# Patient Record
Sex: FEMALE | Race: WHITE
[De-identification: names, ages, dates, MRNs, and addresses within clinical notes are randomized per-mention and may not be internally consistent; named-entity substitution may affect disease eponyms.]

---

## 2018-09-04 ENCOUNTER — HOSPITAL ENCOUNTER (EMERGENCY)
Dept: HOSPITAL 97 - ER | Age: 63
Discharge: ANOTHER HOSPITAL | End: 2018-09-04
Payer: COMMERCIAL

## 2018-09-04 DIAGNOSIS — S06.5X9A: Primary | ICD-10-CM
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
## 2018-09-04 DIAGNOSIS — E03.9: ICD-10-CM
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
## 2018-09-04 LAB
BUN BLD-MCNC: 14 MG/DL (ref 7–18)
CKMB CREATINE KINASE MB: < 1 NG/ML (ref 0.3–3.6)
GLUCOSE SERPLBLD-MCNC: 166 MG/DL (ref 74–106)
HCT VFR BLD CALC: 37.5 % (ref 36–45)
INR BLD: 1.12
LYMPHOCYTES # SPEC AUTO: 0.3 K/UL (ref 0.7–4.9)
MCH RBC QN AUTO: 30.7 PG (ref 27–35)
MCV RBC: 89.9 FL (ref 80–100)
PMV BLD: 9.6 FL (ref 7.6–11.3)
POTASSIUM SERPL-SCNC: 3.5 MMOL/L (ref 3.5–5.1)
RBC # BLD: 4.17 M/UL (ref 3.86–4.86)
TROPONIN (EMERG DEPT USE ONLY): < 0.02 NG/ML (ref 0–0.04)

## 2018-09-04 PROCEDURE — 80048 BASIC METABOLIC PNL TOTAL CA: CPT

## 2018-09-04 PROCEDURE — 84484 ASSAY OF TROPONIN QUANT: CPT

## 2018-09-04 PROCEDURE — 82553 CREATINE MB FRACTION: CPT

## 2018-09-04 PROCEDURE — 85730 THROMBOPLASTIN TIME PARTIAL: CPT

## 2018-09-04 PROCEDURE — 72125 CT NECK SPINE W/O DYE: CPT

## 2018-09-04 PROCEDURE — 99285 EMERGENCY DEPT VISIT HI MDM: CPT

## 2018-09-04 PROCEDURE — 93005 ELECTROCARDIOGRAM TRACING: CPT

## 2018-09-04 PROCEDURE — 76377 3D RENDER W/INTRP POSTPROCES: CPT

## 2018-09-04 PROCEDURE — 85610 PROTHROMBIN TIME: CPT

## 2018-09-04 PROCEDURE — 85025 COMPLETE CBC W/AUTO DIFF WBC: CPT

## 2018-09-04 PROCEDURE — 82550 ASSAY OF CK (CPK): CPT

## 2018-09-04 PROCEDURE — 36415 COLL VENOUS BLD VENIPUNCTURE: CPT

## 2018-09-04 PROCEDURE — 70450 CT HEAD/BRAIN W/O DYE: CPT

## 2018-09-04 PROCEDURE — 70486 CT MAXILLOFACIAL W/O DYE: CPT

## 2018-09-04 NOTE — RAD REPORT
EXAM DESCRIPTION:  CT - [id number] - 9/4/2018 7:37 pm

 

CLINICAL HISTORY:  Fall, head injury, lethargy

 

COMPARISON:  CT head August 20, 2018.

 

TECHNIQUE:  Axial 5 mm thick images of the head were obtained.  Axial 2 mm thick images of the cervic
al spine were obtained with sagittal and coronal reconstruction images generated and reviewed.

 

All CT scans are performed using dose optimization technique as appropriate and may include automated
 exposure control or mA/KV adjustment according to patient size.

 

FINDINGS:  An acute subdural hematoma is present 7 mm in thickness along the lateral margin of the ri
ght frontal and parietal lobes. Anteriorly there is a small interhemispheric subdural hematoma. No mi
dline shift is present. Patient has a significant posttraumatic injury to the bilateral frontal lobes
 and subfrontal parenchyma. There is intraparenchymal hemorrhage present worse on the right as well a
s cortical contusion change. Significant frontal lobe edema is present.

 

No intraventricular hemorrhage identified. No acute cortical based infarction. Mastoid air cells and 
paranasal sinuses are clear. No globe or orbit abnormality seen.

 

The patient's left-sided occipital and suboccipital fracture of the skull has not changed. Superior r
ight occipital fracture also present and unchanged.

 

The patient had significant left frontal injury on the August 28 examination. The right-sided subdura
l hematoma is new and the extensive right-sided intraparenchymal and cortical contusion bulla changes
 are new from August 20.

 

Cervical body height and alignment are normal. Disc space narrowing is present at C4-5 and C5-6. Mild
 facet degenerative change present. No fracture or acute bony abnormality. Central canal detail is in
herently limited.

 

No paraspinal mass or hematoma.

 

IMPRESSION:  Right lateral frontal parietal acute subdural hematoma 7 mm in thickness. There is a sma
ll anterior interhemispheric subdural hematoma. No resulting midline shift.

 

Right frontal intraparenchymal hemorrhage and subcortical contusion changes. This extends into the an
terior right temporal lobe. Significant white matter edema is present. This is new from August 20.

 

Left frontal white matter edema with trace amount of cortical contusion in the subfrontal region has 
not changed from August 20.

 

Cervical spine degenerative change with no fracture.

## 2018-09-04 NOTE — XMS REPORT
Continuity of Care Document

 Created on:2018



Patient:SUNI BILL

Sex:Female

:1955

External Reference #:[id number]





Demographics







 Address  [address] [place] [id number]

 

 Phone  [phone]

 

 Preferred Language  Unknown

 

 Marital Status  Unknown

 

 Protestant Affiliation  Unknown

 

 Race  Unknown

 

 Ethnic Group  Unknown









Author







 Organization  [facility]









Problems







 Problem  Status  Onset  Classification  Date  Comments  Source



     Date    Reported    



             



             

 

 SDH  Active          [address]



             



             

 

 FALL  Active          [address]



             



             

 

 JUAN BILLING  Active          [address]



             



             

 

 R SDH, PONTINE  Active           [facility]



 [facility]    8        



             



             

 

 ICH  Active           [facility]



     8        



             



             

 

 ICH (<span  Active    Problem  2018    [facility]



 ID="[id number]"            Medical



 >Confirmed</span>            [place]



 )            



             

 

 Dysphagia  Active    Problem  2018    [facility]



             



             

 

 Skull fracture  Active    Problem  2018    [facility]



             



             

 

 HTN (<span  Active    Problem  2018    [facility]



 ID="[id number]"            Medical



 >Confirmed</span>            [place]



 )            



             

 

 TRAUM SUBRAC HEM  Active          [facility]            Medical



 DURATION,            Center



             



             

 

 NONTRAUMATIC  Active           [facility]



 INTRACEREBRAL            



 HEMORRHAGE, U            



             



             







Medications







 Medication  Details  Route  Status  Patient  Ordering  Order  Source



         Instructions  Provider  Date  



               



               



               

 

 levothyroxine 88  88    On Hold      07/10/2  [facility]



 mcg (0.088 mg)  microgram=1          018  Medical



 oral tablet  tab, PO,            Center



   Q630AM, 0            



   Refill(s)            



               



               

 

 bisacodyl 10 mg  10 mg=1 supp,    On Hold      07/10/2  [facility]



 rectal  TN, Daily,          018  Medical



 suppository  PRN            [place]



   Constipation,            



   0 Refill(s)            



               



               

 

 acetaminophen  PO, Q6H, PRN    On Hold      07/10/2  [facility]



 160 mg/5 mL oral  Pain 1-3/Temp          018  Medical



 suspension  > 100.4 F, 0            Center



   Refill(s)            



               



               

 

 sennosides, USP  8.6 mg=1 tab,    On Hold      07/10/2  [facility]



 8.6 MG Oral  PO, Q12H, 0          018  Medical



 Tablet  Refill(s)            [place]



               



               

 

 rOPINIRole 0.5  1 mg=2 tab,    On Hold      07/10/2  [facility]



 mg oral tablet  PO, Bedtime,          018  Medical



   0 Refill(s)            [place]



               



               

 

 polyethylene  PO, Daily, 0    On Hold      07/10/2  [facility]



 glycol 3350 oral  Refill(s)          018  Medical



 powder for              [place]



 reconstitution              



               



               

 

 Melatonin 3 MG  3 mg=1 tab,    On Hold      07/10/2  [facility]



 Extended Release  PO, Bedtime,          018  Medical



 Tablet  0 Refill(s)            [place]



               



               

 

 heparin sodium,  SUB-Q, Q8H, 0    On Hold      07/10/2  [facility]



 porcine 2500  Refill(s)          018  Medical



 UNT/ML              Center



 Injectable              



 Solution              



               

 

 Docusate Sodium  PO, Q12H, 0    On Hold      07/10/2  [facility]



 100 MG Oral  Refill(s)          [facility]  Medical



 Capsule              [place]



               



               

 

 carvedilol 25 mg  25 mg=1 tab,    On Hold      07/10/2  [facility]



 oral tablet  PO, Q12H, 0          018  Medical



   Refill(s)            [place]



               



               

 

 Aspirin 81 MG  81 mg=1 tab,    On Hold      07/10/2  [facility]



 Enteric Coated  PO, Daily, 0          018  Medical



 Tablet  Refill(s)            [place]



               



               

 

 amLODIPine 10 mg  10 mg=1 tab,    On Hold      07/10/2  [facility]



 oral tablet  PO, Daily, 0          018  Medical



   Refill(s)            [place]



               



               

 

 Bisacodyl  10 mg, 1    Inactive        [facility]



   supp, Route:          018  Medical



   TN, Drug            Center



   form: SUPP,            



   ONCE, Dosing            



   Weight            



   72.727, kg,            



   PRN as needed            



   for            



   constipation,            



   Start date:            



   18            



   8:22:00            



   CDTNotes:            



   (Same As:            



   Dulcolax,            



   Bisco-Lax)            



               



               

 

 Miralax  17 gm, 1 pkt,    No Longer        [facility]



   Route: PO,    Active      018  Medical



   Drug form:            [place]



   PWDR, Daily,            



   Dosing Weight            



   72.727, kg,            



   Start date:            



   18            



   9:00:00 CDT,            



   Duration: 30            



   day, Stop            



   date:            



   18            



   9:00:00            



   CDTNotes:            



   Dissolve in 8            



   oz of water            



   or juice.            



   (Same as:            



   Miralax)            



               



               

 

 Benadryl  25 mg, 1 cap,    No Longer        [facility]



   Route: PO,    Active      018  Medical



   Drug form:            [place]



   CAP, Bedtime,            



   Dosing Weight            



   72.727, kg,            



   PRN as needed            



   for insomnia,            



   Start date:            



   18            



   21:00:00 CDT,            



   Stop date:            



   18            



   21:00:00            



   CDTNotes:            



   (Same as:            



   Benadryl)            



               



               

 

 Benadryl  25 mg, 0.5    Inactive        [facility]



   mL, Route:          018  Medical



   IVP, Drug            Center



   form: INJ,            



   ONCE, Dosing            



   Weight            



   72.727, kg,            



   PRN Insomnia,            



   Start date:            



   18            



   21:28:00            



   CDTNotes:            



   (Same as:            



   Benadryl)            



               



               

 

 Benadryl  25 mg, Route:    No Longer        [facility]



   PO, Bedtime,    Active      018  Medical



   Dosing Weight            Center



   72.727, kg,            



   Start date:            



   18            



   21:00:00 CDT,            



   Duration: 30            



   day, Stop            



   date:            



   18            



   21:00:00 CDT            



               



               

 

 Benadryl  25 mg, 1 cap,    Inactive        [facility]



   Route: PO,          018  Medical



   Drug form:            [place]



   CAP, ONCE,            



   Dosing Weight            



   72.727, kg,            



   PRN as needed            



   for insomnia,            



   Start date:            



   18            



   23:30:00            



   CDTNotes:            



   (Same as:            



   Benadryl)            



               



               

 

 Aspirin Enteric  81 mg, 1 tab,    No Longer        [facility]



 Coated  Route: PO,    Active      018  Medical



   Drug form:            [place]



   ECTAB, Daily,            



   Dosing Weight            



   72.727, kg,            



   Start date:            



   18            



   9:00:00 CDT,            



   Duration: 30            



   day, Stop            



   date:            



   18            



   9:00:00            



   CDTNotes: Do            



   not crush or            



   chew. (Same            



   As: Ecotrin)            



               



               

 

 mannitol 20%  35 gm, 175    No Longer        [facility]



 intravenous  mL, Route:    Active      018  Medical



 solution  IV, Drug            Center



   form: INJ,            



   Q12H, Dosing            



   Weight            



   72.727, kg,            



   Start date:            



   18            



   17:00:00 CDT,            



   Duration: 30            



   day, Stop            



   date:            



   18            



   7:00:00            



   CDTNotes:            



   (Same as:            



   Osmitrol)            



   Infuse            



   through 5            



   micron or            



   smaller            



   filter            



   WASTE: F/P -            



   Sink; E -            



   Municipal            



   Trash Bin            



               



               

 

 Metoprolol  5 mg, 5 mL,    No Longer        [facility]



   Route: IVP,    Active      018  Medical



   Drug form:            [place]



   INJ, Q30Min,            



   Dosing Weight            



   72.727, kg,            



   PRN            



   Tachycardia,            



   Start date:            



   18            



   13:09:00 CDT,            



   Duration: 30            



   day, Stop            



   date:            



   18            



   13:08:00            



   CDTNotes:            



   (Same as:            



   Lopressor)            



   Push over 2            



   minutes            



               



               

 

 mannitol 20%  35 gm, 175    No Longer        [facility]



 intravenous  mL, Route:    Active      018  Medical



 solution  IV, Drug            Center



   form: INJ,            



   Q8H-05,            



   Dosing Weight            



   72.727, kg,            



   Start date:            



   18            



   20:00:00 CDT,            



   Duration: 30            



   day, Stop            



   date:            



   18            



   12:00:00            



   CDTNotes:            



   (Same as:            



   Osmitrol)            



   Infuse            



   through 5            



   micron or            



   smaller            



   filter            



   WASTE: F/P -            



   Sink; E -            



   Municipal            



   Trash Bin            



               



               

 

 mannitol 20%  35 gm, 175    Inactive        [facility]



 intravenous  mL, Route:          018  Medical



 solution  IV, Drug            Center



   form: INJ,            



   ABXQ8H,            



   Dosing Weight            



   72.727, kg,            



   Start date:            



   18            



   14:00:00 CDT,            



   Duration: 30            



   day, Stop            



   date:            



   18            



   6:00:00            



   CDTNotes:            



   (Same as:            



   Osmitrol)            



   Infuse            



   through 5            



   micron or            



   smaller            



   filter            



   WASTE: F/P -            



   Sink; E -            



   Municipal            



   Trash Bin            



               



               

 

 mannitol 20%  35 gm, 175    Inactive        [facility]



 intravenous  mL, Route:          018  Medical



 solution  IV, Drug            Center



   form: INJ,            



   Q6H, Dosing            



   Weight            



   72.727, kg,            



   Start date:            



   18            



   12:00:00 CDT,            



   Duration: 30            



   day, Stop            



   date:            



   18            



   6:00:00            



   CDTNotes:            



   (Same as:            



   Osmitrol)            



   Infuse            



   through 5            



   micron or            



   smaller            



   filter            



   WASTE: F/P -            



   Sink; E -            



   Municipal            



   Trash Bin            



               



               

 

 Vasotec  0.625 mg, 0.5    No Longer        [facility]



   mL, Route:    Active      018  Medical



   IVP, Drug            Center



   form: INJ,            



   Q6H, Dosing            



   Weight            



   72.727, kg,            



   PRN            



   Hypertension,            



   Start date:            



   18            



   23:37:00 CDT,            



   Duration: 30            



   day, Stop            



   date:            



   18            



   23:36:00            



   CDTNotes:            



   (Same as:            



   Vasotec-IV)            



               



               

 

 Norvasc  10 mg, 1 tab,    No Longer        [facility]



   Route: PO,    Active      018  Medical



   Drug form:            [place]



   TAB, Daily,            



   Dosing Weight            



   72.727, kg,            



   Priority:            



   STAT, Start            



   date:            



   18            



   23:26:00 CDT,            



   Duration: 30            



   day, Stop            



   date:            



   18            



   9:00:00            



   CDTNotes:            



   (Same as:            



   Norvasc)            



               



               

 

 Coreg  25 mg, 1 tab,    No Longer        [facility]



   Route: PO,    Active      018  Medical



   Drug form:            [place]



   TAB, Q12H,            



   Dosing Weight            



   72.727, kg,            



   Start date:            



   18            



   21:00:00 CDT,            



   Stop date:            



   18            



   9:00:00            



   CDTNotes:            



   Give with            



   food. (Same            



   As: Coreg)            



               



               

 

 Hydralazine  25 mg, 1 tab,    No Longer        [facility]



 Hydrochloride 25  Route: PO,    Active      018  Medical



 MG Oral Tablet  Drug form:            [place]



   TAB, Q6H,            



   Dosing Weight            



   72.727, kg,            



   PRN            



   Hypertension,            



   Start date:            



   18            



   10:11:00 CDT,            



   Duration: 30            



   day, Stop            



   date:            



   18            



   10:10:00            



   CDTNotes:            



   (Same as:            



   Apresoline)            



   May interfere            



   w/enteral            



   feedings            



               



               



          Take            



   With Food.            



               



               

 

 Labetalol  20 mg, 4 mL,    No Longer        [facility]



   Route: IVP,    Active      018  Medical



   Drug form:            [place]



   INJ, Q1H,            



   Dosing Weight            



   72.727, kg,            



   PRN            



   Hypertension,            



   Start date:            



   18            



   21:38:00 CDT,            



   Stop date:            



   18            



   21:37:00 CDT            



               



               

 

 Mannitol  35 gm, 175    No Longer        [facility]



   mL, Route:    Active      018  Medical



   IVPB, Drug            Center



   form: INJ,            



   Q6H, Dosing            



   Weight            



   72.727, kg,            



   Start date:            



   18            



   12:00:00 CDT,            



   Duration: 30            



   day, Stop            



   date:            



   18            



   6:00:00            



   CDTNotes:            



   (Same as:            



   Osmitrol)            



   Infuse            



   through 5            



   micron or            



   smaller            



   filter            



   WASTE: F/P -            



   Sink; E -            



   Municipal            



   Trash Bin            



               



               

 

 Fleet Enema  133 mL,    Inactive        [facility]



   Route: TN,          018  Medical



   Drug Form:            [place]



   WAN, Dosing            



   Weight            



   72.727, kg,            



   ONCE, PRN as            



   needed for            



   constipation,            



   Start date:            



   18            



   10:10:00 CDT            



               



               

 

 Miralax  17 gm, 1 pkt,    No Longer        [facility]



   Route: PO,    Active      018  Medical



   Drug form:            [place]



   PWDR, BID,            



   Dosing Weight            



   72.727, kg,            



   Start date:            



   18            



   9:00:00 CDT,            



   Duration: 30            



   day, Stop            



   date:            



   18            



   17:00:00            



   CDTNotes:            



   Dissolve in 8            



   oz of water            



   or juice.            



   (Same as:            



   Miralax)            



               



               

 

 magnesium  150 ml,    Inactive        [facility]



 citrate 58.2  Route: PO,          018  Medical



 MG/ML Oral  Drug Form:            [place]



 Solution  LIQ, Dosing            



   Weight            



   72.727, kg,            



   ONCE, PRN            



   Constipation,            



   Start date:            



   18            



   7:52:00            



   CDTNotes:            



   (Same as:            



   Citrate of            



   [place])            



   Concentration            



   : 1.745 gm /            



   30 mL            



               



               

 

 ropinirole  1 mg, 2 tab,    No Longer        [facility]



   Route: PO,    Active      018  Medical



   Drug form:            [place]



   TAB, Bedtime,            



   Dosing Weight            



   72.727, kg,            



   Start date:            



   18            



   21:00:00 CDT,            



   Duration: 30            



   day, Stop            



   date:            



   18            



   21:00:00            



   CDTNotes:            



   (Same as:            



   Requip)            



               



               

 

 Clonidine  0.1 mg, 1    Inactive        [facility]



 Hydrochloride  tab, Route:          018  Medical



 0.1 MG Oral  PO, Drug            [place]



 Tablet  form: TAB,            



   Bedtime,            



   Dosing Weight            



   72.727, kg,            



   Start date:            



   18            



   21:00:00 CDT,            



   Duration: 7            



   day, Stop            



   date:            



   18            



   21:00:00            



   CDTNotes:            



   (Same As:            



   Catapres)            



               



               

 

 Melatonin 3 MG  3 mg, 1 tab,    No Longer        [facility]



 Extended Release  Route: PO,    Active      018  Medical



 Tablet  Drug Form:            [place]



   TAB, Dosing            



   Weight            



   72.727, kg,            



   Bedtime,            



   Start date:            



   18            



   21:00:00 CDT,            



   Duration: 30            



   day, Stop            



   date:            



   18            



   21:00:00            



   CDTNotes:            



   (Same as:            



   Melatonin)            



               



               

 

 normal saline  1,000 mL,    No Longer        [facility]



 0.9% IV 1,000 mL  Rate: 50    Active      018  Medical



   ml/hr, Infuse            Center



   over: 20 hr,            



   Route: IV,            



   Dosing Weight            



   72.727 kg,            



   Total Volume:            



   1,000, Start            



   date:            



   18            



   9:44:00 CDT,            



   Duration: 30            



   day, Stop            



   date:            



   18            



   9:43:00 CDT,            



   1.84, m2            



               



               

 

 Diltiazem  10 mg, 2 mL,    Inactive        [facility]



   Route: IV,          018  Medical



   Drug form:            Center



   INJ, ONCE,            



   Dosing Weight            



   72.727, kg,            



   Start date:            



   18            



   7:51:00 CDT,            



   Stop date:            



   18            



   7:51:00            



   CDTNotes:            



   (Same as:            



   Cardizem)            



               



               

 

 ropinirole  0.25 mg, 1    Inactive        [facility]



   tab, Route:          018  Medical



   NG, Drug            Center



   form: TAB,            



   ONCE, Dosing            



   Weight            



   72.727, kg,            



   Start date:            



   18            



   7:41:00 CDT,            



   Stop date:            



   18            



   7:41:00            



   CDTNotes:            



   (Same as:            



   Requip)            



               



               

 

 NS (Bolus) IV  250 mL, 250    Inactive        [facility]



   ml/hr, Infuse          [address] [place] [address]



   Over: 1 hr,            [place]



   Route: IV,            



   250, Drug            



   form: INJ,            



   ONCE,            



   Priority:            



   STAT, Dosing            



   Weight 72.727            



   kg, Start            



   date:            



   18            



   7:19:00 CDT,            



   Stop date:            



   18            



   7:19:00 CDT            



               



               

 

 Metoprolol  5 mg, 5 mL,    No Longer        [facility]



   Route: IVP,    Active      018  Medical



   Drug form:            Center



   INJ, Q5Min,            



   Dosing Weight            



   72.727, kg,            



   PRN Other            



   -See Comment,            



   Start date:            



   18            



   7:16:00 CDT,            



   Duration: 2            



   doses or            



   times, Stop            



   date: Limited            



   # of            



   timesNotes:            



   (Same as:            



   Lopressor)            



   Push over 2            



   minutes            



               



               

 

 Cardene 20 mg in  20 mg, 200    Inactive        [facility]



  mL  mL, Rate:          018  Medical



 (Titrate.) IV 20  Titrate,            Center



 mg  Start Dose: 5            



   mg/hr,            



   Titration:            



   2.5 mg/hr            



   every 15            



   minutes,            



   Goal(s): goal            



   SBP Notes:            



   Same as:            



   Cardene            



   Concentration            



   : (0.1 mg/ 1            



   ml)            



               

 

 Labetalol  10 mg, 2 mL,    No Longer        [facility]



   Route: IVP,    Active      018  Medical



   Drug form:            Center



   INJ, ONCE,            



   Dosing Weight            



   72.727, kg,            



   PRN            



   Hypertension,            



   Start date:            



   18            



   0:24:00 CDT            



               



               

 

 Coreg  6.25 mg, 1    No Longer        [facility]



   tab, Route:    Active      018  Medical



   PO, Drug            Center



   form: TAB,            



   Q12H, Dosing            



   Weight            



   72.727, kg,            



   Start date:            



   18            



   0:00:00 CDT,            



   Duration: 30            



   day, Stop            



   date:            



   18            



   21:00:00            



   CDTNotes:            



   Give with            



   food. (Same            



   As: Coreg)            



               



               

 

 Hydralazine  10 mg, 0.5    No Longer        [facility]



   mL, Route:    Active      018  Medical



   IVP, Drug            Center



   form: INJ,            



   Q2H, Dosing            



   Weight            



   72.727, kg,            



   PRN            



   Hypertension,            



   Start date:            



   18            



   22:28:00 CDT,            



   Duration: 30            



   day, Stop            



   date:            



   18            



   22:27:00            



   CDTNotes:            



   (Same as:            



   Apresoline)            



   Push over 5            



   minutes            



               



               

 

 Mannitol  25 gm, 125    No Longer        [facility]



   mL, Route:    Active      018  Medical



   IVPB, Drug            Center



   form: INJ,            



   Q8H-01,            



   Dosing Weight            



   72.727, kg,            



   Priority:            



   NOW, Start            



   date:            



   18            



   17:09:00 CDT,            



   Duration: 30            



   day, Stop            



   date:            



   18            



   17:00:00            



   CDTNotes:            



   (Same as:            



   Osmitrol)            



   Infuse            



   through 5            



   micron or            



   smaller            



   filter            



   WASTE: F/P -            



   Sink; E -            



   Municipal            



   Trash Bin            



               



               

 

 Mannitol  25 gm, 125    Inactive        [facility]



   mL, Route:          018  Medical



   IVPB, Drug            Center



   form: INJ,            



   Q6H, Dosing            



   Weight            



   72.727, kg,            



   PRN Other            



   -See Comment,            



   Start date:            



   18            



   14:50:00 CDT,            



   Duration: 30            



   day, Stop            



   date:            



   18            



   14:49:00            



   CDTNotes:            



   (Same as:            



   Osmitrol)            



   Infuse            



   through 5            



   micron or            



   smaller            



   filter            



   WASTE: F/P -            



   Sink; E -            



   Municipal            



   Trash Bin            



               



               

 

 Mannitol  50 gm, 250    Inactive         [place]



   mL, Route:          018  Medical



   IVPB, Drug            Center



   form: INJ,            



   ONCE, Dosing            



   Weight            



   72.727, kg,            



   Start date:            



   18            



   10:21:00 CDT,            



   Stop date:            



   18            



   10:21:00            



   CDTNotes:            



   (Same as:            



   Osmitrol)            



   Infuse            



   through 5            



   micron or            



   smaller            



   filter            



   WASTE: F/P -            



   Sink; E -            



   Municipal            



   Trash Bin            



               



               

 

 Levetiracetam  500 mg, 1    No Longer         [place]



 500 MG Oral  tab, Route:    Active      018  Medical



 Tablet [Keppra]  PO, Drug            Center



   form: TAB,            



   Q12H, Dosing            



   Weight            



   72.727, kg,            



   Start date:            



   18            



   9:00:00 CDT,            



   Stop date:            



   18            



   21:00:00            



   CDTNotes:            



   (Same            



   as:Keppra)            



               



               

 

 Ciprofloxacin 3  5 drp, Route:    No Longer        [facility]



 MG/ML /  LEFT EAR,    Active      018  Medical



 Dexamethasone 1  Drug Form:            Center



 MG/ML Otic  SOLN, Dosing            



 Suspension  Weight            



 [Ciprodex]  72.727, kg,            



   BID, Start            



   date:            



   18            



   9:00:00 CDT,            



   Duration: 7            



   day, Stop            



   date:            



   18            



   17:00:00            



   CDTNotes:            



   (Same As:            



   Ciprodex)            



               



               

 

 heparin sodium,  5,000 unit, 1    No Longer        [facility]



 porcine 2500  mL, Route:    Active      018  Medical



 UNT/ML  SUB-Q, Drug            Center



 Injectable  form: INJ,            



 Solution  Q8H, Dosing            



   Weight            



   72.727, kg,            



   Start date:            



   18            



   8:00:00 CDT,            



   Duration: 30            



   day, Stop            



   date:            



   18            



   0:00:00            



   CDTNotes:            



   porcine            



   heparin            



               



               

 

 Thyroxine  88 microgram,    No Longer        [facility]



   1 tab, Route:    Active      018  Medical



   PO, Drug            Center



   form: TAB,            



   Q630AM,            



   Dosing Weight            



   72.727, kg,            



   Start date:            



   18            



   6:30:00 CDT,            



   Duration: 30            



   day, Stop            



   date:            



   18            



   6:30:00            



   CDTNotes:            



   Take 1 hour            



   before or 2            



   hours after            



   meal; Enteral            



   feeds may            



   interefere            



   with the            



   absorption of            



   this            



   medication.            



   (Same            



   as:Synthroid)            



               



               

 

 sterile water  457.25 mL,    Inactive        [facility] [place]



 INJ 1000 ml  Rate: 999          018  Medical



 457.25 mL +  ml/hr, Infuse            Center



 sodium chloride  over: 0.5 hr,            



 23.4%  mEq  Route: IV,            



   Dosing Weight            



   72.727 kg,            



   Total Volume:            



   500, Start            



   date:            



   18            



   6:02:00 CDT,            



   Duration: 1            



   doses or            



   times, Stop            



   date:            



   18            



   6:31:00 CDT,            



   For IMU and            



   ICU use only.            



    See Order            



   Comments!!,            



   1.8...            



               



               

 

 Keppra  1,500 mg,    Inactive         [place]



   Route: IVPB,          018  Medical



   ONCE, Dosing            Center



   Weight            



   72.727, kg,            



   Loading Dose,            



   Start date:            



   18            



   4:16:00 CDT,            



   Stop date:            



   18            



   4:16:00            



   CDTNotes:            



   Same as            



   Keppra  Mix            



   with 100 mL            



   NS, LR or D5W            



     ***            



   MEDICATION            



   WASTE ***            



   Product Size:            



    500 mg            



   Product            



   Wasted:  _0__            



   mg            



               

 

 Water 1000 MG/ML  457.25 mL,    Inactive        [facility] [place]



 Injectable  Rate: 999          018  Medical



 Solution  ml/hr, Infuse            Center



   over: 0.5 hr,            



   Route: IV,            



   Dosing Weight            



   72.727 kg,            



   Total Volume:            



   500, Start            



   date:            



   18            



   3:50:00 CDT,            



   Duration: 1            



   doses or            



   times, Stop            



   date:            



   18            



   4:19:00 CDT,            



   For IMU and            



   ICU use only.            



    See Order            



   Comments!!,            



   1.8...            



               



               

 

 Levetiracetam  500 mg, 1    No Longer         [place]



 500 MG Oral  tab, Route:    Active      018  Medical



 Tablet [Keppra]  PO, Drug            Center



   form: TAB,            



   Q12H, Dosing            



   Weight            



   72.727, kg,            



   Start date:            



   18            



   21:00:00 CDT,            



   Duration: 30            



   day, Stop            



   date:            



   18            



   9:00:00            



   CDTNotes:            



   (Same            



   as:Keppra)            



               



               

 

 gabapentin 50  300 mg, 6 mL,    No Longer        [facility]



 MG/ML Oral  Route: PO,    Active      018  Medical



 Solution  Drug form:            Center



   SOLN, Q8H,            



   Dosing Weight            



   72.727, kg,            



   Start date:            



   18            



   18:00:00 CDT,            



   Duration: 30            



   day, Stop            



   date:            



   18            



   16:00:00            



   CDTNotes:            



   (Same as:            



   Neurontin)            



               



               

 

 gabapentin  300 mg, 1    Inactive        [facility]



   cap, Route:          018  Medical



   PO, Drug            Center



   form: CAP,            



   Q8H, Dosing            



   Weight            



   72.727, kg,            



   (CrCl > 60            



   ml/min),            



   Start date:            



   18            



   16:00:00 CDT,            



   Duration: 30            



   day, Stop            



   date:            



   18            



   8:00:00            



   CDTNotes:            



   (Same as:            



   Neurontin)            



               



               

 

 levothyroxine 88  88    No Longer        [facility] [place]



 mcg (0.088 mg)  microgram=1    Active      018  Medical



 oral tablet  tab, PO,            Center



   Daily, 0            



   Refill(s)            



               



               

 

 vortioxetine 10  10 mg=1 tab,    No Longer        [facility]



 MG Oral Tablet  PO, Daily, 0    Active      018  Medical



 [Trintellix]  Refill(s)            [place]



               



               

 

 donepezil 10 mg  10 mg=1 tab,    No Longer        [facility]



 oral tablet  PO, BID, 0    Active      018  Medical



   Refill(s)            [place]



               



               

 

 Saline Flush  10 ml, Route:    No Longer        [facility] [place]



 0.9%  IVP, Drug    Active      018  Medical



   Form: INJ,            Center



   Dosing Weight            



   72.727, kg,            



   Q12H, Start            



   date:            



   18            



   9:00:00 CDT,            



   Duration: 30            



   day, Stop            



   date:            



   18            



   21:00:00            



   CDTNotes:            



   (Same as: BD            



   Posiflush)            



               



               

 

 Levetiracetam  500 mg,    Inactive        [facility]



   Route: IVPB,          018  Medical



   Q12H, Dosing            Center



   Weight            



   72.727, kg,            



   Priority:            



   Routine,            



   Start date:            



   18            



   9:00:00 CDT,            



   Duration: 30            



   day, Stop            



   date:            



   18            



   21:00:00            



   CDTNotes:            



   Same as            



   Keppra  Mix            



   with 100 mL            



   NS, LR or D5W            



     ***            



   MEDICATION            



   WASTE ***            



   Product Size:            



    500 mg            



   Product            



   Wasted:  ___            



   mg            



               

 

 sennosides, USP  8.6 mg, 1    No Longer        [facility]



   tab, Route:    Active      018  Medical



   PO, Drug            Center



   Form: TAB,            



   Dosing Weight            



   72.727, kg,            



   Q12H, Start            



   date:            



   18            



   9:00:00 CDT,            



   Duration: 30            



   day, Stop            



   date:            



   18            



   21:00:00            



   CDTNotes:            



   (Same as:            



   Senokot)            



               



               

 

 Famotidine  20 mg, 2 mL,    Inactive        [facility]



   Route: IVP,          018  Medical



   Drug form:            Center



   INJ, Q12H,            



   Dosing Weight            



   72.727, kg,            



   Start date:            



   18            



   9:00:00 CDT,            



   Duration: 30            



   day, Stop            



   date:            



   18            



   21:00:00            



   CDTNotes:            



   (Same as:            



   Pepcid) Can            



   be dilute in            



   5-10cc NS            



   IVP: Slow IV            



   push over at            



   least 2            



   minutes.            



               



               

 

 Docusate Sodium  100 mg, 10    No Longer        [facility]



 100 MG Oral  mL, Route:    Active      018  Medical



 Capsule  PO, Drug            Center



   form: LIQ,            



   Q12H, Dosing            



   Weight            



   72.727, kg,            



   Start date:            



   18            



   9:00:00 CDT,            



   Stop date:            



   18            



   21:00:00            



   CDTNotes:            



   (Same as:            



   William)            



               



               

 

 Versed  2 mg, 2 mL,    No Longer        [facility]



   Route: IVP,    Active      018  Medical



   Drug form:            Center



   INJ, ONCE,            



   Dosing Weight            



   72.727, kg,            



   Start date:            



   18            



   7:59:00 CDT,            



   Stop date:            



   18            



   7:59:00            



   CDTNotes:            



   (Same as:            



   Versed)   ***            



   MEDICATION            



   WASTE ***            



   Product Size:            



    2 mg Product            



   Wasted:  ___            



   mg            



               

 

 Versed  2 mg, 2 mL,    No Longer        [facility]



   Route: IVP,    Active      018  Medical



   Drug form:            Center



   INJ, ONCE,            



   Dosing Weight            



   72.727, kg,            



   Start date:            



   18            



   7:58:00 CDT,            



   Stop date:            



   18            



   7:58:00            



   CDTNotes:            



   (Same as:            



   Versed)   ***            



   MEDICATION            



   WASTE ***            



   Product Size:            



    2 mg Product            



   Wasted:  ___            



   mg            



               

 

 Versed  2 mg, 2 mL,    Inactive        [facility]



   Route: IVP,          [facility]  [facility]



   Drug form:            Center



   INJ, ONCE,            



   Dosing Weight            



   72.727, kg,            



   Start date:            



   18            



   6:33:00 CDT,            



   Stop date:            



   18            



   6:33:00            



   CDTNotes:            



   (Same as:            



   Versed)   ***            



   MEDICATION            



   WASTE ***            



   Product Size:            



    5 mg Product            



   Wasted:  ___            



   mg            



               

 

 Hydralazine  10 mg, 0.5    No Longer         [place]



   mL, Route:    Active      [facility]  [facility]



   IVP, Drug            Center



   form: INJ,            



   Q2H, Dosing            



   Weight            



   72.727, kg,            



   PRN            



   Hypertension,            



   Start date:            



   18            



   3:24:00 CDT,            



   Duration: 30            



   day, Stop            



   date:            



   18            



   3:23:00            



   CDTNotes:            



   (Same as:            



   Apresoline)            



   Push over 5            



   minutes            



               



               

 

 Calcium  1,000 mg, 2    No Longer        [facility] [place]



 Carbonate 500 MG  tab, Route:    Active      [facility]  [facility]



 Chewable Tablet  PO, Drug            Center



   form:            



   CHEWTAB, PRN,            



   Dosing Weight            



   72.727, kg,            



   PRN Abnormal            



   Lab Result,            



   FOR ICU USE            



   ONLY, Start            



   date:            



   18            



   23:38:00 CDT,            



   Duration: 30            



   day, Stop            



   date:            



   18            



   23:37:00            



   CDTNotes:            



   (Same As:            



   Tums) Calcium            



   Carbonate 500            



   di=624 mg            



   elemental            



   calcium            



   Dose=______            



   mg calcium            



   carbonate            



   (______ mg            



   elemental            



   calcium)            



               



               

 

 Potassium  10 mEq, 50    No Longer        [facility]



 Chloride  mL, Route:    Active      [facility]  [facility]



   IVPB, Drug            Center



   form: INJ,            



   PRN, Dosing            



   Weight            



   72.727, kg,            



   PRN Abnormal            



   Lab Result,            



   Via            



   peripheral            



   line, Start            



   date:            



   18            



   23:38:00 CDT,            



   Duration: 30            



   day, Stop            



   date:            



   18            



   23:37:00 CDT,            



   FOR ICU USE            



   ONLYNotes:            



   (Same as:            



   KCL)  Infuse            



   over 2 hours.            



               



               

 

 Magnesium  2 gm, 50 mL,    No Longer        [facility]



 Sulfate  Route: IVPB,    Active      [facility]  Medical



   Drug form:            Center



   INJ, PRN,            



   Dosing Weight            



   72.727, kg,            



   PRN Abnormal            



   Lab Result,            



   Start date:            



   18            



   23:38:00 CDT,            



   Duration: 30            



   day, Stop            



   date:            



   18            



   23:37:00 CDT,            



   FOR ICU USE            



   ONLYNotes:            



   WASTE: F/P -            



   Sink; E -            



   Municipal            



   Trash Bin            



               



               

 

 Magnesium Oxide  800 mg, 2    No Longer        [facility]



   tab, Route:    Active      018  Medical



   PO, Drug            Center



   form: TAB,            



   PRN, Dosing            



   Weight            



   72.727, kg,            



   PRN Abnormal            



   Lab Result,            



   FOR ICU USE            



   ONLY, Start            



   date:            



   18            



   23:38:00 CDT,            



   Duration: 30            



   day, Stop            



   date:            



   18            



   23:37:00            



   CDTNotes:            



   (Same as:            



   Mag-Ox 400)            



   Magnesium            



   oxide            



   [id number]            



   elemental            



   magnesium            



   Dose=____mg            



   magnesium            



   oxide (___mg            



   elemental            



   magnesium)            



               



               

 

 Calcium  1 gm, 10 mL,    No Longer         [place]



 Gluconate  Route: IVPB,    Active      018  Medical



   PRN, Dosing            Center



   Weight            



   72.727, kg,            



   PRN Abnormal            



   Lab Result,            



   Start date:            



   18            



   23:38:00 CDT,            



   Duration: 30            



   day, Stop            



   date:            



   18            



   23:37:00 CDT,            



   FOR ICU USE            



   ONLYNotes:            



   WASTE: F/P -            



   Sink; E -            



   Municipal            



   Trash Bin            



               



               

 

 sodium phosphate  30 mmol, 10    No Longer         [place]



   mL, Route:    Active      018  Medical



   IVPB, PRN,            Center



   Dosing Weight            



   72.727, kg,            



   PRN Abnormal            



   Lab Result,            



   Start date:            



   18            



   23:38:00 CDT,            



   Duration: 30            



   day, Stop            



   date:            



   18            



   23:37:00 CDT,            



   FOR ICU USE            



   ONLY            



               

 

 potassium  30 mmol, 10    No Longer        [facility] [place]



 phosphate  mL, Route:    Active      018  Medical



   IVPB, PRN,            Center



   Dosing Weight            



   72.727, kg,            



   PRN Abnormal            



   Lab Result,            



   Start date:            



   18            



   23:38:00 CDT,            



   Duration: 30            



   day, Stop            



   date:            



   18            



   23:37:00 CDT,            



   FOR ICU USE            



   ONLYNotes:            



   (Same as: K            



   Phosphate.)            



   1 mMol            



   phoshate has            



   1.47 mEq            



   potassium            



   Infuse over 4            



   hours            



               



               

 

 potassium  2 pkt, Route:    No Longer        [facility]



 phosphate-sodium  PO, Drug    Active      018  Medical



 phosphate 250  Form:            Center



 mg-280 mg-160 mg  PDR/REC,            



 oral powder for  Dosing Weight            



 reconstitution  72.727, kg,            



   PRN, PRN            



   Abnormal Lab            



   Result, FOR            



   ICU USE ONLY,            



   Start date:            



   18            



   23:38:00 CDT,            



   Duration: 30            



   day, Stop            



   date:            



   18            



   23:37:00            



   CDTNotes:            



   (Same as:            



   Phos-NaK)            



   Each 1.5 gm            



   pkt has 250mg            



   phosphorous.            



   Mix w/2.5oz            



   water and            



   stir.            



               



               

 

 normal saline  1,000 mL,    No Longer        [facility]



 0.9% IV 1,000 mL  Rate: 75    Active      018  Medical



   ml/hr, Infuse            Center



   over: 13.3            



   hr, Route:            



   IV, Dosing            



   Weight 72.727            



   kg, Total            



   Volume:            



   1,000, Start            



   date:            



   18            



   21:06:00 CDT,            



   Duration: 30            



   day, Stop            



   date:            



   18            



   21:05:00 CDT,            



   1.86, m2            



               



               

 

 Insulin regular  10 unit, 0.1    No Longer        [facility]



   mL, Route:    Active      018  Medical



   SUB-Q, Drug            Center



   form: SOLN,            



   Sliding            



   Scale, Dosing            



   Weight            



   72.727, kg,            



   PRN Blood            



   Glucose            



   Results,            



   Start date:            



   18            



   21:05:00 CDT,            



   Duration: 30            



   day, Stop            



   date:            



   18            



   21:04:00            



   CDTNotes:            



   (Same as:            



   Humulin R)            



   Roll in palms            



   of hands            



   gently;  Do            



   not shake            



   vigorously.            



   "single            



   patient use            



   only"            



   (Restricted            



   to patients            



   requiring a            



   dose >  60            



   units)            



   WASTE: F/P -            



   Black; E -            



   Municipal            



   Trash Bin            



   Stable for 28            



   days at room            



   temperature            



   Expires in            



   ______ days            



   from            



   _____________            



   _Date            



               



               

 

 Saline Flush  10 ml, Route:    No Longer        [facility] [place]



 0.9%  IVP, Drug    Active      [facility]



   Form: INJ,            Center



   Dosing Weight            



   72.727, kg,            



   PRN, PRN Line            



   Flush, Start            



   date:            



   18            



   21:05:00 CDT,            



   Duration: 30            



   day, Stop            



   date:            



   07/26/18            



   21:04:00            



   CDTNotes:            



   (Same as: BD            



   Posiflush)            



               



               

 

 Morphine  2 mg, 0.5 mL,    Inactive        [facility]



   Route: IVP,          [facility]  [facility]



   Drug form:            Center



   SOLN, Q2H,            



   Dosing Weight            



   72.727, kg,            



   PRN Pain            



   Score 7-10,            



   Start date:            



   18            



   21:05:00 CDT,            



   Duration: 30            



   day, Stop            



   date:            



   18            



   21:04:00            



   CDTNotes:            



   (Same            



   as:MORPhine            



   Sulfate)            



               



               

 

 Ondansetron  4 mg, 2 mL,    No Longer        [facility]



   Route: IVP,    Active      [facility]  [facility]



   Drug form:            Center



   INJ, Q8H,            



   Dosing Weight            



   72.727, kg,            



   PRN Nausea &            



   Vomiting,            



   Start date:            



   18            



   21:05:00 CDT,            



   Duration: 30            



   day, Stop            



   date:            



   18            



   21:04:00            



   CDTNotes:            



   (Same as:            



   Zofran)   ***            



   MEDICATION            



   WASTE ***            



   Product Size:            



    4 mg Product            



   Wasted:  ___            



   mg            



               

 

 Labetalol  10 mg, 2 mL,    No Longer        [facility]



   Route: IVP,    Active      018  [facility]



   Drug form:            Center



   INJ, Q15Min,            



   Dosing Weight            



   72.727, kg,            



   PRN            



   Hypertension,            



   Start date:            



   18            



   21:05:00 CDT,            



   Duration: 3            



   doses or            



   times, Stop            



   date: Limited            



   # of times            



               



               

 

 Bisacodyl  10 mg, 1    No Longer        [facility]



   supp, Route:    Active      [facility]  [facility]



   TN, Drug            Center



   form: SUPP,            



   Daily, Dosing            



   Weight            



   72.727, kg,            



   PRN            



   Constipation,            



   Start date:            



   18            



   21:05:00 CDT,            



   Duration: 30            



   day, Stop            



   date:            



   18            



   21:04:00            



   CDTNotes:            



   (Same As:            



   Dulcolax,            



   Bisco-Lax)            



               



               

 

 Acetaminophen  1 tab, Route:    Inactive        [facility]



 325 MG /  PO, Drug          018  [facility]



 Hydrocodone  Form: TAB,            Center



 Bitartrate 10 MG  Dosing Weight            



 Oral Tablet  72.727, kg,            



 [Norco 10/325]  Q6H, PRN Pain            



   Score 4-6,            



   STAT, Start            



   date:            



   18            



   21:02:00 CDT,            



   Duration: 30            



   day, Stop            



   date:            



   18            



   21:01:00            



   CDTNotes: Do            



   not exceed            



   4gm/day of            



   acetaminophen            



   .  (Same as:            



   Norco 325/10)            



               



               

 

 Tylenol  650 mg, 20.3    No Longer        [facility]



   mL, Route:    Active      [facility]  [facility]



   PO, Drug            Center



   form: LIQ,            



   Q6H, Dosing            



   Weight            



   72.727, kg,            



   PRN Pain            



   1-3/Temp >            



   100.4 F,            



   Priority:            



   STAT, Start            



   date:            



   18            



   21:02:00 CDT,            



   Stop date:            



   18            



   21:01:00            



   CDTNotes: Max            



   acetaminophen            



   =4000mg/day            



   (4 gm/day).            



   (Same as:            



   Tylenol)            



               



               

 

 NS (Bolus) IV  500 mL, 500    Inactive        [facility] [place]



   ml/hr, Infuse          [facility]  [facility]



   Over: 1 hr,            Center



   Route: IV,            



   500, Drug            



   form: INJ,            



   ONCE,            



   Priority:            



   STAT, Dosing            



   Weight 72.727            



   kg, Start            



   date:            



   18            



   19:50:00 CDT,            



   Stop date:            



   18            



   19:50:00 CDT            



               



               

 

 Cardene 40 mg in  40 mg, 200    No Longer        [facility]



  mL  mL, Rate:    Active      [facility]  [facility]



 (Titrate.) IV 40  Titrate,            Center



 mg  Start Dose: 5            



   mg/hr,            



   Titration:            



   2.5 mg/hr            



   every 15            



   minutes,            



   Goal(s): SBP            



   Notes: Same            



   as: Cardene            



   Concentration            



   : (0.2 mg /1            



   ml )            



               

 

 iodixanol  60 mL, Route:    Inactive        [facility]



   IVP, Drug          [facility]  [facility]



   Form: SOLN,            [place]



   Dosing Weight            



   72.727, kg,            



   ONCALL, STAT,            



   Start date:            



   18            



   19:24:00 CDT,            



   Duration: 1            



   doses or            



   times,            



   Dose=2.2ml/kg            



   ,  Max            



   dose=100ml --            



   "To be            



   infused by            



   Radiology            



   Staff ONLY"            



               



               

 

 Keppra  1,000 mg,    Inactive        [facility]



   Route: IV,          [facility]  [facility]



   ONCE, Dosing            Center



   Weight            



   72.727, kg,            



   Priority:            



   STAT, Start            



   date:            



   18            



   19:20:00 CDT,            



   Stop date:            



   18            



   19:20:00            



   CDTNotes:            



   Same as            



   Keppra  Mix            



   with 100 mL            



   NS, LR or D5W            



     ***            



   MEDICATION            



   WASTE ***            



   Product Size:            



    500 mg            



   Product            



   Wasted:  ___            



   mg            



               

 

 iodixanol  100 mL,    Inactive        [facility]



   Route: IVP,          [facility]  [facility]



   Drug Form:            Center



   SOLN, Dosing            



   Weight            



   72.727, kg,            



   ONCALL, STAT,            



   Start date:            



   18            



   19:09:00 CDT,            



   Duration: 1            



   doses or            



   times,            



   Dose=2.2ml/kg            



   ,  Max            



   dose=100ml --            



   "To be            



   infused by            



   Radiology            



   Staff ONLY"            



               



               

 

 Saline Flush  10 mL, Route:    No Longer        [facility] [place]



 0.9%  IVP, Drug    Active      [facility]  [facility]



   Form: INJ,            Center



   Dosing Weight            



   72.727, kg,            



   PRN, PRN Line            



   Flush, Start            



   date:            



   18            



   18:33:00 CDT,            



   Duration: 30            



   day, Stop            



   date:            



   18            



   18:32:00            



   CDTNotes:            



   (Same as: BD            



   Posiflush)            



               



               







Allergies, Adverse Reactions, Alerts







 Substance  Category  Reaction  Severity  Reaction  Status  Date  Comments  
Source



         type    Reported    



                 



                 



                 

 

 penicillin  Assertion    Mild  Drug  Active      [facility]



                 



                 







Immunizations







 Immunization  Date Given  Site  Status  Last Updated  Comments  Source



             



             







Results







 Order Name  Results  Value  Reference  Date  Interpretation  Comments  Source



       Range        



               



               



               

 

 Abdomen AP  Abdomen AP DX  PROCEDURE: ABDOMEN SINGLE VIEW        -   
[facility]



 DX            -  



               



               



     Clinical Indication:  - assess for stool burden.  Symptoms not specified  
      Read by:  Max Narayan MD  



             Dictated Date/time:  18 17:11  



     Comparison: Multiple priors, most recent 07/10/2018        Electronically 
Signed by:  Max Narayan MD                     18 17:15  



             FINAL REPORT  



               



               



     FINDINGS: Supine views of the abdomen and pelvis were obtained. Clothing 
artifact noted. Moderate volume of fecal material throughout the colon with 
formed feces in the rectosigmoid, increased in volume          



      from prior exam. The bowel gas pattern is otherwise normal. No gross free 
intraperitoneal air, soft tissue masses or pathologic calcifications. Linear 
calcifications overlying the upper abdomen appear          



     to reflect costochondral calcifications. The skeleton is intact. 
Visualized lung bases are clear.          



               



               



               



     IMPRESSION:          



               



     1. Constipation with increased stool burden.          



               



               



               



     SL:  [id number]          



               



               



               



               

 

 Abdomen AP  Abdomen AP DX   Abdomen AP DX 7/10/2018 8:45 AM CDT    07/10    -  
 [facility]



 DX            -  



               



               



     Ordering Physician: Valdez Calderon MD        Read by:  Fernando Carrillo MD  



             Dictated Date/time:  07/10/18 09:06  



             Electronically Signed by:  Fernando Carrillo MD                       
   07/10/18 09:07  



             FINAL REPORT  



     CLINICAL INDICATION:  - please assess stool burden;          



               



               



               



     COMPARISON: 2018.          



               



               



               



     TECHNIQUE: Routine supine AP views  of the abdomen were obtained.          



               



               



               



     FINDINGS:          



               



               



               



     Scattered gas filled loops of bowel are present without pathologic 
dilatation. No free air is seen. The Dobbhoff tube has been removed.          



               



               



               



     No pathologic calcification is identified. No acute osseous abnormality is 
evident.          



               



               



               



     IMPRESSION:          



               



               



               



     Interval removal of Dobbhoff tube. No evidence of bowel obstruction or 
gross pneumoperitoneum.          



               



               



               



               



               



     SL:  [id number]          



               



               



               



               

 

 Chest 1view  Chest 1view  Clinical Indication:  - Baseline pulmonary status.  
  07/10    -   [facility]



 DX  DX          -  



     Comparison: 2018.          



               



             Read by:  Stone Noyola MD  



             Dictated Date/time:  07/10/18 09:06  



     FINDINGS:        Electronically Signed by:  Stone Noyola MD     
            07/10/18 09:07  



             FINAL REPORT  



               



               



     AP chest radiograph was obtained.          



               



               



               



     MEDIASTINUM: The cardiac silhouette is normal in size.  The aorta 
demonstrates atherosclerotic calcification.          



               



               



               



     LUNGS: Unchanged area of chronic bronchiectasis in the left apex. No focal 
infiltrates seen. No pleural effusion. No pneumothorax.          



               



               



               



     OTHER: No acute osseous abnormalities.          



               



               



               



     IMPRESSION:          



               



     No acute cardiopulmonary abnormality identified.          



               



               



               



               



               



     SL:  [id number]          



               



               



               



               

 

 ELECTROLYTE  Sodium Lvl  141 meq/L  135 - 145        [address]



               



               



               

 

 ELECTROLYTE  Creatinine  0.61 mg/dL  0.50 -        [facility]  Lvl    1.40  /      [facility]



               



               



               

 

 ELECTROLYTE  BUN  16 mg/dL  7 - 22        [address]



               



               



               

 

 ELECTROLYTE  Glucose Lvl  99 mg/dL  70 - 99        [address]



               



               



               

 

 ELECTROLYTE  Calcium Lvl  9.0 mg/dL  8.5 - 10.5        [address]



               



               



               

 

 ELECTROLYTE  CO2  26 meq/L  24 - 32        [address]



               



               



               

 

 ELECTROLYTE  Chloride Lvl  105 meq/L  95 - 109        [address]



               



               



               

 

 ELECTROLYTE  AGAP  13.6 meq/L  10.0 -        [facility]      20.0        [facility]



               



               



               

 

 ELECTROLYTE  Potassium Lvl  3.6 meq/L  3.5 - 5.1        [facility]



 S        /      [facility]



               



               



               

 

 ELECTROLYTE  eGFR  97        Result Comment: The eGFR is calculated using 
the CKD-EPI formula. In most young, healthy individuals the eGFR will be >90 mL/
min/1.73m2. The eGFR declines with age. An eGFR of 60-89 may be normal in  [facility] 
[place]



 S    mL/min/1.    some populations, particularly the elderly, for 
whom the CKD-EPI formula has not been extensively validated. Use of the eGFR is 
not recommended in the following populations:  [address]



             Individuals with unstable creatinine concentrations, including 
pregnant patients and those with serious co-morbid conditions.  



               



             Patients with extremes in muscle mass or diet.  



               



             The data above are obtained from the National Kidney Disease 
Education Program (NKDEP) which additionally recommends that when the eGFR is 
used in patients with extremes of body mass index for purposes  



             of drug dosing, the eGFR should be multiplied by the estimated 
BMI.  

 

 CHEM PANEL  Osmolality  290  280 - 300        [facility]



     mOsm/kg          [facility]



               



               



               

 

 CHEM PANEL  eGFR  100        Result Comment: The eGFR is calculated using 
the CKD-EPI formula. In most young, healthy individuals the eGFR will be >90 mL/
min/1.73m2. The eGFR declines with age. An eGFR of 60-89 may be normal in  [facility] 
[place]



     mL/min/1    some populations, particularly the elderly, for 
whom the CKD-EPI formula has not been extensively validated. Use of the eGFR is 
not recommended in the following populations:  [address]



             Individuals with unstable creatinine concentrations, including 
pregnant patients and those with serious co-morbid conditions.  



               



             Patients with extremes in muscle mass or diet.  



               



             The data above are obtained from the National Kidney Disease 
Education Program (NKDEP) which additionally recommends that when the eGFR is 
used in patients with extremes of body mass index for purposes  



             of drug dosing, the eGFR should be multiplied by the estimated 
BMI.  

 

 CHEM PANEL  Calcium Lvl  9.0 mg/dL  8.5 - 10.5        [facility]      [facility]



               



               



               

 

 CHEM PANEL  AGAP  12.6 meq/L  10.0 -        [facility]



       20.0        [facility]



               



               



               

 

 CHEM PANEL  CO2  27 meq/L  24 - 32        [address]



               



               



               

 

 CHEM PANEL  Potassium Lvl  3.6 meq/L  3.5 - 5.1        [facility]      [facility]



               



               



               

 

 CHEM PANEL  Glucose Lvl  98 mg/dL  70 - 99  07      [address]



               



               



               

 

 CHEM PANEL  Sodium Lvl  140 meq/L  135 - 145  07      [address]



               



               



               

 

 CHEM PANEL  Chloride Lvl  104 meq/L  95 - 109        [address]



               



               



               

 

 CHEM PANEL  Creatinine  0.56 mg/dL  0.50 -  07      [facility]



   Lvl    1.40  /[address] [place] [address]



               



               



               

 

 CHEM PANEL  BUN  24 mg/dL  7 - 22        [address]



               



               



               

 

 CHEM PANEL  Bili Total  0.5 mg/dL  0.2 - 1.3        [address]



               



               



               

 

 CHEM PANEL  Bili Direct  null  0.0 - 0.3        [address]



               



               



               

 

 CHEM PANEL  Bili Indirect  Unable to  0.0 - 1.0        [address]



               



               



               

 

 CHEM PANEL  Alk Phos  67 unit/L  39 - 136        [address]



               



               



               

 

 CHEM PANEL  ALT  94 unit/L  0 - 65        [address]



               



               



               

 

 CHEM PANEL  AST  50 unit/L  0 - 37        [address]



               



               



               

 

 CHEM PANEL  A/G Ratio  0.6  0.7 - 1.6        [address]



               



               



               

 

 CHEM PANEL  Albumin Lvl  2.4 g/dL  3.5 - 5.0        [address]



               



               



               

 

 CHEM PANEL  Globulin  3.9 g/dL  2.7 - 4.2        [address]



               



               



               

 

 CHEM PANEL  Total Protein  6.3 g/dL  6.4 - 8.4        [address]



               



               



               

 

 CHEM PANEL  Magnesium Lvl  2.1 mg/dL  1.8 - 2.4        [address]



               



               



               

 

 CHEM PANEL  Phosphorus  3.2 mg/dL  2.5 - 4.5        [address]



               



               



               

 

 HEMATOLOGY  Lymphocytes  8.0 %  20.0 -  07      [facility]



       40.0        [facility]



               



               



               

 

 HEMATOLOGY  Monocytes  7.7 %  2.0 - 12.0        [address]



               



               



               

 

 HEMATOLOGY  Segs  81.9 %  45.0 -  07      [facility]



       75.0  [address]



               



               



               

 

 HEMATOLOGY  Eosinophils  2.1 %  0.0 - 4.0        [address]



               



               



               

 

 HEMATOLOGY  Eosinophils #  0.2 K/CMM  0.0 - 0.5        [address]



               



               



               

 

 HEMATOLOGY  Lymphocytes #  0.9 K/CMM  1.0 - 5.5         [place]



         /2018      [facility]



               



               



               

 

 HEMATOLOGY  Monocytes #  0.9 K/CMM  0.0 - 0.8         [place]



         /2018      [facility]



               



               



               

 

 HEMATOLOGY  Basophils  0.3 %  0.0 - 1.0         [place]



         /2018      [facility]



               



               



               

 

 HEMATOLOGY  Segs-Bands #  9.5 K/CMM  1.5 - 8.1         [place]



         /2018      [facility]



               



               



               

 

 HEMATOLOGY  MCHC  33.9 g/dL  32.0 -         [place]



       36.0        [facility]



               



               



               

 

 HEMATOLOGY  RDW  13.3 %  11.5 -         [place]



       14.5        [facility]



               



               



               

 

 HEMATOLOGY  MCV  89.4 fL  80.0 -        [facility]



       98.0        [facility]



               



               



               

 

 HEMATOLOGY  MCH  30.3 pg  27.0 -         [place]



       31.0        [facility]



               



               



               

 

 HEMATOLOGY  Platelet  245 K/CMM  133 - 450         [place]



         /2018      [facility]



               



               



               

 

 HEMATOLOGY  MPV  9.6 fL  7.4 - 10.4         [place]



         /2018      [facility]



               



               



               

 

 HEMATOLOGY  Hct  31.2 %  36.0 -         [place]



       48.0        [facility]



               



               



               

 

 HEMATOLOGY  RBC  3.49 M/CMM  4.20 -         [place]



       5.40        [facility]



               



               



               

 

 HEMATOLOGY  WBC  11.7 K/CMM  3.7 - 10.4         [place]



         /2018      [facility]



               



               



               

 

 HEMATOLOGY  Hgb  10.6 g/dL  12.0 -         [place]



       16.0        [facility]



               



               



               

 

 PARATHYROID  Ca Ion WB  1.09  1. -        [facility]



 PROFILE    mMol/L  1.      [facility]



               



               



               

 

 PARATHYROID  Ca Norm WB  1.10  1.05 -        [facility]



 PROFILE    mMol/L  .      [facility]



               



               



               

 

 CHEM PANEL  eGFR  100        Result Comment: The eGFR is calculated using 
the CKD-EPI formula. In most young, healthy individuals the eGFR will be >90 mL/
min/1.73m2. The eGFR declines with age. An eGFR of 60-89 may be normal in  [facility] 
[place]



     mL/min/1.    some populations, particularly the elderly, for 
whom the CKD-EPI formula has not been extensively validated. Use of the eGFR is 
not recommended in the following populations:  Medical



     3m2          Center



             Individuals with unstable creatinine concentrations, including 
pregnant patients and those with serious co-morbid conditions.  



               



             Patients with extremes in muscle mass or diet.  



               



             The data above are obtained from the National Kidney Disease 
Education Program (NKDEP) which additionally recommends that when the eGFR is 
used in patients with extremes of body mass index for purposes  



             of drug dosing, the eGFR should be multiplied by the estimated 
BMI.  

 

 CHEM PANEL  AGAP  11.7 meq/L  10.0 -        [facility]



       20.0        [facility]



               



               



               

 

 CHEM PANEL  Calcium Lvl  8.8 mg/dL  8.5 - 10.5        [address]



               



               



               

 

 CHEM PANEL  Potassium Lvl  3.7 meq/L  3.5 - 5.1        [address]



               



               



               

 

 CHEM PANEL  CO2  28 meq/L  24 - 32        [address]



               



               



               

 

 CHEM PANEL  Chloride Lvl  105 meq/L  95 - 109        [address]



               



               



               

 

 CHEM PANEL  Glucose Lvl  112 mg/dL  70 - 99        [address]



               



               



               

 

 CHEM PANEL  BUN  19 mg/dL  7 - 22        [address]



               



               



               

 

 CHEM PANEL  Creatinine  0.57 mg/dL  0.50 -        [facility]



   Lvl    1.40        [facility]



               



               



               

 

 CHEM PANEL  Sodium Lvl  141 meq/L  135 - 145        [address]



               



               



               

 

 CHEM PANEL  Osmolality  296  280 - 300        [facility]



     mOsm/kg    [address]



               



               



               

 

 CHEM PANEL  Osmolality  295  280 - 300        [facility]



     mOsm/kg    [address]



               



               



               

 

 CHEM PANEL  Magnesium Lvl  1.9 mg/dL  1.8 - 2.4        [address]



               



               



               

 

 CHEM PANEL  Phosphorus  3.0 mg/dL  2.5 - 4.5        [address]



               



               



               

 

 HEMATOLOGY  MPV  9.8 fL  7.4 - 10.4        [address]



               



               



               

 

 HEMATOLOGY  Platelet  211 K/CMM  133 - 450        [address]



               



               



               

 

 HEMATOLOGY  RDW  14.0 %  11.5 -        [facility]



       14.5        [facility]



               



               



               

 

 HEMATOLOGY  WBC  10.3 K/CMM  3.7 - 10.4        [address]



               



               



               

 

 HEMATOLOGY  RBC  3.25 M/CMM  4.20 -         [place]



       5.40        [facility]



               



               



               

 

 HEMATOLOGY  Hgb  10.1 g/dL  12.0 -         [place]



       16.0        [facility]



               



               



               

 

 HEMATOLOGY  MCH  31.2 pg  27.0 -         [place]



       31.0        [facility]



               



               



               

 

 HEMATOLOGY  Hct  29.4 %  36.0 -         [place]



       48.0        [facility]



               



               



               

 

 HEMATOLOGY  MCV  90.3 fL  80.0 -         [place]



       98.0        [facility]



               



               



               

 

 HEMATOLOGY  MCHC  34.5 g/dL  32.0 -         [place]



       36.0        [facility]



               



               



               

 

 HEMATOLOGY  Eosinophils #  0.2 K/CMM  0.0 - 0.5  07       [place]



         [address]



               



               



               

 

 HEMATOLOGY  Segs  77.3 %  45.0 -  07/       [place]



       75.0  /      [facility]



               



               



               

 

 HEMATOLOGY  Monocytes  8.9 %  2.0 - 12.0        [address]



               



               



               

 

 HEMATOLOGY  Lymphocytes  11.5 %  20.0 -  07       [place]



       40.0  /      [facility]



               



               



               

 

 HEMATOLOGY  Monocytes #  0.9 K/CMM  0.0 - 0.8        [address]



               



               



               

 

 HEMATOLOGY  Basophils  0.4 %  0.0 - 1.0        [facility]



         [address] [place] [address]



               



               



               

 

 HEMATOLOGY  Eosinophils  1.9 %  0.0 - 4.0  /      [address]



               



               



               

 

 HEMATOLOGY  Lymphocytes #  1.2 K/CMM  1.0 - 5.5        [address]



               



               



               

 

 HEMATOLOGY  Segs-Bands #  8.0 K/CMM  1.5 - 8.1        [address]



               



               



               

 

 PARATHYROID  Ca Ion WB  1.04  1.05 -        [facility]



 PROFILE    mMol/L  .      [facility]



               



               



               

 

 PARATHYROID  Ca Norm WB  1.09  1.05 -        [facility]



 PROFILE    mMol/L  1.      [facility]



               



               



               

 

 CHEM PANEL  Magnesium Lvl  2.1 mg/dL  1.8 - 2.4        [address]



               



               



               

 

 CHEM PANEL  Phosphorus  2.6 mg/dL  2.5 - 4.5        [address]



               



               



               

 

 HEMATOLOGY  MCHC  34.5 g/dL  32.0 -        [facility]



       36.0        [facility]



               



               



               

 

 HEMATOLOGY  MCH  30.9 pg  27.0 -  07/       [place]



       31.0        [facility]



               



               



               

 

 HEMATOLOGY  MPV  10.2 fL  7.4 - 10.4        [address]



               



               



               

 

 HEMATOLOGY  Platelet  201 K/CMM  133 - 450  07      [facility]



         [address] [place] [address]



               



               



               

 

 HEMATOLOGY  RDW  13.9 %  11.5 -        [facility]



       14.5        [facility]



               



               



               

 

 HEMATOLOGY  MCV  89.5 fL  80.0 -        [facility]



       98.0        [facility]



               



               



               

 

 HEMATOLOGY  Hct  27.8 %  36.0 -        [facility]



       48.0        [facility]



               



               



               

 

 HEMATOLOGY  Hgb  9.6 g/dL  12.0 -        [facility]



       16.0        [facility]



               



               



               

 

 HEMATOLOGY  WBC  10.5 K/CMM  3.7 - 10.4        [address]



               



               



               

 

 HEMATOLOGY  RBC  3.11 M/CMM  4.20 -        [facility]



       5.40        [facility]



               



               



               

 

 HEMATOLOGY  Monocytes #  0.9 K/CMM  0.0 - 0.8        [address]



               



               



               

 

 HEMATOLOGY  Segs-Bands #  8.5 K/CMM  1.5 - 8.1        [address]



               



               



               

 

 HEMATOLOGY  Lymphocytes #  1.0 K/CMM  1.0 - 5.5        [address]



               



               



               

 

 HEMATOLOGY  Basophils  0.3 %  0.0 - 1.0  /      [address]



               



               



               

 

 HEMATOLOGY  Eosinophils  0.5 %  0.0 - 4.0        [address]



               



               



               

 

 HEMATOLOGY  Monocytes  8.6 %  2.0 - 12.0        [address]



               



               



               

 

 HEMATOLOGY  Lymphocytes  9.6 %  20.0 -        [facility]



       40.0        [facility]



               



               



               

 

 HEMATOLOGY  Segs  81.0 %  45.0 -        [facility] [place]



       75.0        [facility]



               



               



               

 

 PARATHYROID  Ca Ion WB  1.08  1.05 -        [facility]



 PROFILE    mMol/L  1.      [facility]



               



               



               

 

 PARATHYROID  Ca Norm WB  1.13  1.05 -        [facility]



 PROFILE    mMol/L  1.      [facility]



               



               



               

 

 HEMATOLOGY  Eosinophils #  0.1 K/CMM  0.0 - 0.5        [address]



               



               



               

 

   Culture:  >100,000 CFU/mL Group B Streptococcus          [facility]



   Urine            Medical



     No susceptibility performed since these organisms are          Center



               



     predictably susceptible to penicillin.  If patient is          



               



     penicillin allergic or susceptibility testing for additional          



               



     antibiotics is clinically warranted please call the          



               



     laboratory.          

 

 URINE AND  UA Gran Cast  5 /LPF          [address]



               



               



               

 

 URINE AND  UA Sq Epi  None Seen          [address]



               



               



               

 

 URINE AND  UA Color  Yellow  Yellow        [address]          [place]



               



     (18 3:47 PM)          



               

 

 URINE AND  UA Turbidity  Slight  Clear        [facility]        2018      Medical



     *ABN*          [place]



               



     (18 3:47 PM)          



               

 

 URINE AND  UA  2.0 mg/dL  0.1 - 1.0        [facility]  Urobilinogen      /[address] [place] [address]



               



               



               

 

 URINE AND  UA Blood  Moderate  Negative        [facility]        2018      Medical



     *ABN*          [place]



               



     (18 3:47 PM)          



               

 

 URINE AND  UA Ketones  Negative  Negative        [facility]    mg/dL  mg/dL  [address] [place] [address]



               



               



               

 

 URINE AND  UA Bili  Negative  Negative        [address]          [place]



               



     (18 3:47 PM)          



               

 

 URINE AND  UA pH  6.0  5.0 - 8.0        [address]



               



               



               

 

 URINE AND  UA Protein  100 mg/dL  Negative        [facility]      mg/dL  /[address] [place] [address]



               



               



               

 

 URINE AND  UA Spec Grav  1.028  <=1.030        [address]



               



               



               

 

 URINE AND  UA Glucose  Negative  Negative        [facility]    mg/dL  mg/dL  [address] [place] [address]



               



               



               

 

 URINE AND  UA Bacteria  Moderate  None Seen        [facility]    /HPF  /HPF  /[address] [place] [address]



               



               



               

 

 URINE AND  UA Mucus  Many /LPF  None Seen        [facility]      /LPF  /[address] [place] [address]



               



               



               

 

 URINE AND  UA RBC  null  0 - 2        [address]



               



               



               

 

 URINE AND  UA Leuk Est  Negative  Negative        [address]



     (18 3:47 PM)          [place]



               



               



               

 

 URINE AND  UA WBC  6 /HPF  0 - 5        [address]



               



               



               

 

 URINE AND  UA Nitrite  Negative  Negative        [address]



     (18 3:47 PM)          [place]



               



               



               

 

 CHEM PANEL  Globulin  2.6 g/dL  2.7 - 4.2        [address]



               



               



               

 

 CHEM PANEL  Albumin Lvl  2.0 g/dL  3.5 - 5.0        [address]



               



               



               

 

 CHEM PANEL  AST  21 unit/L  0 - 37        [address]



               



               



               

 

 CHEM PANEL  ALT  25 unit/L  0 - 65        [address]



               



               



               

 

 CHEM PANEL  Alk Phos  39 unit/L  39 - 136        [address]



               



               



               

 

 CHEM PANEL  A/G Ratio  0.8  0.7 - 1.6        [address]



               



               



               

 

 CHEM PANEL  Total Protein  4.6 g/dL  6.4 - 8.4        [address]



               



               



               

 

 CHEM PANEL  Bili Total  0.4 mg/dL  0.2 - 1.3        [address]



               



               



               

 

 CHEM PANEL  Bili Indirect  Unable to  0.0 - 1.0        [address]



               



               



               

 

 CHEM PANEL  Bili Direct  null  0.0 - 0.3        [address]



               



               



               

 

 CARDIAC  Troponin-I  null  0.00 -        [facility]



 ENZYMES      0.40        [facility]



               



               



               

 

 CHEM PANEL  A/G Ratio  1.0  0.7 - 1.6        [address]



               



               



               

 

 CHEM PANEL  Globulin  3.2 g/dL  2.7 - 4.2        [address]



               



               



               

 

 CHEM PANEL  Albumin Lvl  3.3 g/dL  3.5 - 5.0        [address]



               



               



               

 

 CHEM PANEL  Bili Total  1.0 mg/dL  0.2 - 1.3        [address]



               



               



               

 

 CHEM PANEL  Alk Phos  49 unit/L  39 - 136        [address]



               



               



               

 

 CHEM PANEL  AST  43 unit/L  0 - 37        [address]



               



               



               

 

 CHEM PANEL  ALT  29 unit/L  0 - 65        [address]



               



               



               

 

 CHEM PANEL  B/C Ratio  16  6 - 25        [address]



               



               



               

 

 CHEM PANEL  Total Protein  6.5 g/dL  6.4 - 8.4        [address]



               



               



               

 

 CHEM PANEL  Bili Indirect  0.8 mg/dL  0.0 - 1.0        [address]



               



               



               

 

 CHEM PANEL  Bili Direct  0.1 mg/dL  0.0 - 0.3        [address]



               



               



               

 

 CHEM PANEL  Bili Total  0.9 mg/dL  0.2 - 1.3        [address]



               



               



               

 

 CHEM PANEL  Total Protein  6.6 g/dL  6.4 - 8.4        [address]



               



               



               

 

 CHEM PANEL  Albumin Lvl  3.3 g/dL  3.5 - 5.0        [address]



               



               



               

 

 CHEM PANEL  AST  47 unit/L  0 - 37        [address]



               



               



               

 

 CHEM PANEL  Alk Phos  50 unit/L  39 - 136        [address]



               



               



               

 

 CHEM PANEL  Globulin  3.3 g/dL  2.7 - 4.2        [address]



               



               



               

 

 CHEM PANEL  ALT  27 unit/L  0 - 65        [address]



               



               



               

 

 CHEM PANEL  A/G Ratio  1.0  0.7 - 1.6        [address]



               



               



               

 

 DRUG SCREEN  U Amph Scr  Negative  Negative        [address]          [place]



               



     (18 12:19 AM)          



               

 

 DRUG SCREEN  U Prudence Scr  Negative  Negative        [address]          [place]



               



     (18 12:19 AM)          



               

 

 DRUG SCREEN  U Benzodia  Positive  Negative        [facility]      2018      Medical



     *ABN*          [place]



               



     (18 12:19 AM)          



               

 

 DRUG SCREEN  U Cocaine Scr  Negative  Negative        [facility]



         2018      Medical



     *NA*          [place]



               



     (18 12:19 AM)          



               

 

 DRUG SCREEN  UDS Note  See Note          [address]



     (18 12:19 AM)          [place]



               



               



               

 

 DRUG SCREEN  U Phencyc Scr  Negative  Negative        [facility]



         2018      Medical



     *NA*          [place]



               



     (18 12:19 AM)          



               

 

 DRUG SCREEN  U Cannab Scr  Negative  Negative        [facility]



         2018      Medical



     *NA*          [place]



               



     (18 12:19 AM)          



               

 

 DRUG SCREEN  U Opiate Scr  Negative  Negative        [facility]



         2018      Medical



     *NA*          [place]



               



     (18 12:19 AM)          



               

 

 URINE AND  UA Sq Epi  None Seen          [address]



               



               



               

 

 URINE AND  UA RBC  14 /HPF  0 - 2        [address]



               



               



               

 

 URINE AND  UA Mucus  Many /LPF  None Seen        [facility]



 STOOL      /LPF  /[address] [place] [address]



               



               



               

 

 URINE AND  UA WBC  2 /HPF  0 - 5        [address]



               



               



               

 

 URINE AND  UA Leuk Est  Negative  Negative        [address]



     (18 12:19 AM)          [place]



               



               



               

 

 URINE AND  UA Nitrite  Negative  Negative        [address]



     (18 12:19 AM)          [place]



               



               



               

 

 URINE AND  UA Ketones  80 mg/dL  Negative        [facility]      mg/dL  /      [facility]



               



               



               

 

 URINE AND  UA Bili  Negative  Negative        [facility]        2018      Medical



     *NA*          [place]



               



     (18 12:19 AM)          



               

 

 URINE AND  UA Blood  Small  Negative        [facility]        2018      Medical



     *ABN*          [place]



               



     (18 12:19 AM)          



               

 

 URINE AND  UA  2.0 mg/dL  0.1 - 1.0        [facility]  Urobilinogen      /[address] [place] [address]



               



               



               

 

 URINE AND  UA Turbidity  Clear  Clear        [address]



     (18 12:19 AM)          [place]



               



               



               

 

 URINE AND  UA Color  Yellow  Yellow        [facility]        2018      Medical



     *NA*          [place]



               



     (18 12:19 AM)          



               

 

 URINE AND  UA Spec Grav  1.023  <=1.030        [address]



               



               



               

 

 URINE AND  UA Protein  50 mg/dL  Negative        [facility]      mg/dL  /[address] [place] [address]



               



               



               

 

 URINE AND  UA pH  6.0  5.0 - 8.0        [address]



               



               



               

 

 URINE AND  UA Glucose  Negative  Negative        [facility]    mg/dL  mg/dL  [address] [place] [address]



               



               



               

 

 Brain wo  Brain wo  EXAM: CT BRAIN WITHOUT CONTRAST        [facility]



 contrast CT  contrast CT      /    [facility]



               



     DATE: 2018 3:40 AM CDT        Read by:  Alice Chavira  



             Dictated Date/time:  18 08:37  



             Electronically Signed by:  Alice Chavira           
   18 08:42  



             FINAL REPORT  



     INDICATION:  - contussions          



               



               



               



     COMPARISON: Brain CT dated 2018          



               



               



               



     TECHNIQUE: Routine axial CT images of the brain were obtained. Reformatted 
images in the sagittal and coronal plane were included.          



               



     IV contrast: None.          



               



     DLP: 1546 mGy-cm          



               



               



               



     FINDINGS:          



               



               



               



     Hemorrhagic parenchymal contusions in the orbitofrontal gyri, more 
extensive on the left remain similar in distribution as compared to the 
previous exam. The shearing injury in the midbrain is also unch          



     anged. Multiple subdural and subarachnoid hemorrhage hemorrhages are 
identified bilaterally, which remain similar in distribution as compared to the 
prior exam. An epidural hemorrhage in the occipital c          



     onvexity near the left occipital fracture is again noted. There is 
intraventricular hemorrhage in the occipital horns, without hydrocephalus, 
unchanged in appearance. The basal cisterns are patent.          



               



               



               



     IMPRESSION:          



               



               



               



     Stable appearance of the intraparenchymal, intraventricular, and extra-
axial hemorrhages.          



               



               



               



               

 

 Abdomen AP  Abdomen AP DX  EXAM: XR ABDOMEN 1 VIEW        -  [facility]



 DX        /[address] [place] [address]



               



     DATE: 2018 5:26 PM CDT        Read by:  Lobito Jaramillo MD  



             Dictated Date/time:  18 19:27  



             Electronically Signed by:  Lobito Jaramillo MD              
   18 19:28  



             FINAL REPORT  



     INDICATION:  - dobhoff placement          



               



               



               



     ADDITIONAL INFORMATION: None.          



               



               



               



     COMPARISON: CT cap 2018          



               



               



               



     TECHNIQUE: Frontal images of the abdomen=3.          



               



               



               



     FINDINGS:          



               



               



               



     Lines and tubes: Repositioning of enteric tube with distal tip projecting 
over the gastric antrum with guidewire present in the final image.          



               



     Overlying EKG leads.          



               



               



               



     Lower thorax: Unremarkable where visualized.          



               



               



               



     Bowel: Nonobstructive bowel gas pattern.          



               



               



               



     Solid organs: No abnormal mass or organomegaly seen.          



               



               



               



     Calcifications: No abnormal calcifications found.          



               



               



               



     Bones: No acute osseous lesions.          



               



               



               



     IMPRESSION:          



               



     1.  Enteric tube as described.          



               



               



               



               

 

 CARDIAC  Troponin-I  null  0.00 -        [facility]



 ENZYMES      0.40  /      [facility]



               



               



               

 

 CARDIAC  Troponin-T  null  0.000 -        [facility] [place]



 ENZYMES      0.100  /      [facility]



               



               



               

 

 CARDIAC  Total CK  338 unit/L  12 - 191        [facility]



 ENZYMES        [address]



               



               



               

 

 CARDIAC  CK MB Index  1.6  0.0 - 2.5        [facility]



 ENZYMES        [address]



               



               



               

 

 CARDIAC  CK MB  5.3 ng/mL  0.5 - 3.6        [address]



               



               



               

 

 Brain wo  Brain wo  EXAM: CT BRAIN WITHOUT CONTRAST        [facility]



 contrast CT  contrast CT      /    -  Medical



             This report was dictated by a Radiology Resident/Fellow.  I have 
personally reviewed the images as  Center



             well as the Resident's interpretation and agree with the findings.
  



     DATE: 2018 at 6:06 AM        Read by:  Stone Cummings MD        
       Resident:  Stone Cummings MD  



             Dictated Date/time:  18 08:08  



             Electronically Signed by:  Moy Mitchell MD                
   18 16:53  



             FINAL REPORT  



     INDICATION: Parenchymal contusions          



               



               



               



     COMPARISON: CT brain 2018 at 2305 hours, 1903 hours a day and        
  



               



               



               



     TECHNIQUE: Axial CT images of the brain were obtained. Sagittal and 
coronal reformats.          



               



     IV contrast: None.          



               



     DLP: 969 mGy-cm          



               



               



               



     FINDINGS:          



               



               



               



     There is stable multicompartment intracranial hemorrhage. There is 
epidural hemorrhage along the left occiput with associated left occipital 
fracture. There are left greater than right bifrontal hemorrh          



     agic contusions. There is also hemorrhagic contusion in the right temporal 
lobe. There is right greater than left subdural hemorrhage along the 
convexities as well as scattered subarachnoid hemorrhage.          



     There is parenchymal hemorrhage in the superior natty. No new hemorrhage.  
        



               



               



               



     There is unchanged 3 mm rightward midline shift. No herniation. No 
hydrocephalus.          



               



               



               



     Paranasal sinuses and mastoid air cells are clear. Subcutaneous gas again 
present along the left occipital fracture.          



               



               



               



               



               



     IMPRESSION:          



               



     No adverse change since prior exam.          



               



               



               



               

 

 HEMATOLOGY  ACT (TEG)  113 s  86 - 118        [address]



               



               



               

 

 HEMATOLOGY  R-time Rapid  0.7 min  0.4 - 0.7        [address]



               



               



               

 

 HEMATOLOGY  [place]  0.6 min          [address]



               



               



               

 

 HEMATOLOGY  K-time Rapid  1.3 min  0.6 - 2.3        [address]



               



               



               

 

 HEMATOLOGY  G-value Rapid  8.6 K d/sc  5.0 - 11.6        [address]



               



               



               

 

 HEMATOLOGY  Max Amplitude  63 mm  52 - 71        [address]



               



               



               

 

 HEMATOLOGY  Angle Rapid  74 degrees  64 - 80        [address]



               



               



               

 

 HEMATOLOGY  Estimated %  0.1 %  0.0 - 7.5        [facility]



   Lysis Rapid            [facility]



               



               



               

 

 HEMATOLOGY  ASA Effect  432 ARU          [facility]



   Plt            [facility]



               



               



               

 

 BACTERIAL -  MRSA by PCR  Negative          [facility]



 SEROLOGY              [facility]



     (18 11:50 PM)          [place]



               



               



               

 

 CARDIAC  Troponin-T  null  0.000 -        [facility]



 ENZYMES      0.100        [facility]



               



               



               

 

 CARDIAC  Troponin-I  null  0.00 -        [facility]



 ENZYMES      0.40  /      [facility]



               



               



               

 

 CARDIAC  Total CK  117 unit/L  12 - 191        [facility]



 ENZYMES        [address] [place] [address]



               



               



               

 

 HEMATOLOGY  PT  15.0 s  12.0 -         [place]



       14.7        [facility]



               



               



               

 

 HEMATOLOGY  INR  1.17  0.85 -         [place]



       1.17        [facility]



               



               



               

 

 HEMATOLOGY  PTT  28.9 s  22.9 -         [place]



       35.8        [facility]



               



               



               

 

 HEMATOLOGY  Plav Effect  295 PRU          [facility]



   Plt            [facility]



               



               



               

 

 HEMATOLOGY  ASA Effect  423 ARU          [facility]



   Plt            [facility]



               



               



               

 

 BLOOD BANK  Platelet  Product available          [facility]



 RESULTS  product            Medical



     (18 7:38 PM)          Center



               



               



               

 

 BLOOD BANK  Antibody Scrn  Negative          [facility]



 RESULTS              Medical



     (18 6:35 PM)          [place]



               



               



               

 

 BLOOD BANK  ABO/Rh  O POS          [facility]



 RESULTS              [facility]



               



               



               

 

 CHEM PANEL  Lactic Acid  1.7 mMol/L  0.5 - 2.2        [facility]



   Lvl            [facility]



               



               



               

 

 ENDOCRINOLO  S Preg  Negative  Negative        [facility]



 GY              Medical



     *NA*          Center



               



     (18 6:35 PM)          



               

 

 HEMATOLOGY  Estimated %  1.7 %  0.0 - 7.5        [facility]



   Lysis Rapid            [facility]



               



               



               

 

 HEMATOLOGY  R-time Rapid  0.8 min  0.4 - 0.7        [facility]      [facility]



               



               



               

 

 HEMATOLOGY  [place]  0.7 min          [facility]



   Rapid            [facility]



               



               



               

 

 HEMATOLOGY  ACT (TEG)  121 s  86 - 118        [facility]



   Rapid            [facility]



               



               



               

 

 HEMATOLOGY  G-value Rapid  10.1 K  5.0 - 11.6        [facility]



     d/sc          [facility]



               



               



               

 

 HEMATOLOGY  Max Amplitude  67 mm  52 - 71        [address]



               



               



               

 

 HEMATOLOGY  Angle Rapid  72 degrees  64 - 80        [address]



               



               



               

 

 HEMATOLOGY  K-time Rapid  1.5 min  0.6 - 2.3        [address]



               



               



               

 

 Brain wo  Brain wo  EXAM: CT HEAD WITHOUT CONTRAST        -  [facility]



 contrast CT  contrast CT      /    -  Medical



             This report was dictated by a Radiology Resident/Fellow.  I have 
personally reviewed the images as  Center



             well as the Resident's interpretation and agree with the findings.
  



     DATE: 2018 11:06 PM CDT        Read by:  Marci Whitley MD        Resident:  Marci Whitley MD  



             Dictated Date/time:  18 23:16  



             Electronically Signed by:  Felton Hill MD                      
   18 23:29  



             FINAL REPORT  



     INDICATION: 62 years old Female patient with history of bifrontal cerebral 
contusions, EDH, SDH, TSAH.          



               



               



               



     TECHNIQUE: Multiple axial images were obtained through the head from 
vertex to the skull base. Axial bone algorithm reconstruction images are 
provided.          



               



               



               



     COMPARISON: Prior CT scan of the head dated 2018 at 703 PM          



               



               



               



     FINDINGS:          



               



               



               



     Again identified are midline occipital convexity epidural hematoma, 
bilateral cerebral convexity subdural hematoma, bilateral inferior frontal lobes
, right anterior temporal lobes, superior natty hemorrh          



     agic parenchymal contusions, subdural hemorrhage along the 
interhemispheric falx, scattered subarachnoid hemorrhage without interval 
progression.          



               



               



               



     No definite new parenchymal abnormality or new hemorrhage is identified.  
        



               



               



               



     There is no significant mass effect and stable 2 mm left-to-right midline 
shift midline shift.          



               



               



               



     Overall ventricles are stable in size and configuration.          



               



               



               



     Basal cisterns are grossly preserved. There is no evidence of downward 
herniation at the level of foramen magnum.          



               



               



               



     No interval significant adverse changes in visualized paranasal sinuses, 
orbits, mastoid cavities and calvarium.          



               



               



               



     IMPRESSION:          



               



     1. Overall no interval significant adverse change.          



               



     2. Stable appearance of multicompartmental intracranial hemorrhages from 4 
hours earlier CT scan.          



               



               



               



     These findings are in agreement with previous preliminary report made by 
on call radiology resident.          



               



               



               



               

 

 Brain wo  Brain wo  EXAM: CT HEAD WITHOUT CONTRAST        [facility]



 contrast CT  contrast CT      /2018    -  Medical



             This report was dictated by a Radiology Resident/Fellow.  I have 
personally reviewed the images as  Center



             well as the Resident's interpretation and agree with the findings.
  



     DATE: 2018 6:26 PM CDT        Read by:  Marci Whitley MD
        Resident:  Marci Whitley MD  



             Dictated Date/time:  18 19:18  



             Electronically Signed by:  Felton Hill MD                      
   18 21:24  



             FINAL REPORT  



     INDICATION: 62 years old Female patient with history of Trauma.          



               



               



               



     TECHNIQUE: Multiple axial images were obtained through the head from 
vertex to the skull base. Axial bone algorithm reconstruction images are 
provided.          



               



               



               



     COMPARISON: None.          



               



               



               



     FINDINGS:          



               



               



               



     Note is made of comminuted mildly displaced fracture of the left 
paramedian occipital bone extending through the medial margin of the left 
jugular bulb to the left carotid canal. There is underneath 11          



     mm thick epidural hematoma at the midline. There is overlying moderate 
scalp hematoma.          



               



               



               



     Bilateral inferior frontal and right anterior temporal lobe hemorrhagic 
parenchymal contusions.          



               



               



               



     Small subdural hemorrhage along the right cerebral convexity and to lesser 
extent left anterior cerebral convexity. Subdural hemorrhage is also identified 
along the interhemispheric falx extending along the both tentorium.          



               



               



               



     Hemorrhagic shearing injury within the left midline superior natty.        
  



               



               



               



     Scattered small amount of subarachnoid hemorrhage is identified.          



               



               



               



     Basal cisterns are well preserved. No evidence of abdominal herniation the 
level of foramen magnum.          



               



               



               



     No evidence of obstructive hydrocephalus. There is no significant midline 
shift.          



               



               



               



     Visualized paranasal sinuses are clear. Mastoid air cells are well 
aerated. Visualized orbits appear grossly unremarkable.          



               



               



               



     IMPRESSION:          



               



     1. Comminuted mildly displaced fracture of the left paramedian occipital 
bone extending through the medial margin of the left jugular bulb to the left 
carotid canal. Underneath 11 mm thick epidural hema          



     hua at the midline occipital convexity. Overlying moderate scalp 
hematoma.          



               



     2. Bilateral inferior frontal and right anterior temporal lobe hemorrhagic 
parenchymal contusions.          



               



     3. Small subdural hemorrhage along the right cerebral convexity and to 
lesser extent left anterior cerebral convexity. Subdural hemorrhage along the 
interhemispheric falx extending along the both tentorium.          



               



     4. Hemorrhagic shearing injury within the midline superior natty.          



               



     5. Scattered small amount of subarachnoid hemorrhage is identified.       
   



               



               



               



     These findings are in agreement with previous preliminary report made by 
on call radiology resident.          



               



               



               



     Findings communicated to Dr. Odell on 2018 at 1821 hours          



               



               



               



               

 

 Spine  Spine  EXAM: CT CERVICAL SPINE WITHOUT CONTRAST        -  [facility]



 cervical wo  cervical     -  Medical



 contrast CT  contrast CT          This report was dictated by a Radiology 
Resident/Fellow.  I have personally reviewed the images as  Center



 (ER)  (ER)          well as the Resident's interpretation and agree with the 
findings.  



     DATE: 2018 6:26 PM CDT        Read by:  Sesar Tam MD         
        Resident:  Sesar Tam MD  



             Dictated Date/time:  18 19:32  



             Electronically Signed by:  Grzegorz Jade MD                
   18 19:52  



             FINAL REPORT  



     INDICATION: Trauma          



               



               



               



     COMPARISON: Contemporaneous brain CT and CT cap.          



               



               



               



     TECHNIQUE:  Volumetric acquisition of the cervical spine without contrast. 
Axial, sagittal and coronal reconstructions.          



               



     IV contrast: None.          



               



     DLP: 403 mGy-cm          



               



     UT SECTION: ER          



               



               



               



     FINDINGS:          



               



               



               



     The spine is imaged from the skull base to the level of T2.          



               



               



               



     No acute cervical spine fracture.          



               



     There is an acute displaced left occipital bone skull base fracture 
extending through the temporal and into the carotid canal.          



               



               



               



     The prevertebral soft tissues are normal.          



               



     Airspace opacities in the bilateral apices better evaluated on the 
comparison CT CAP.          



               



               



               



     IMPRESSION:          



               



               



               



     1.  No acute fracture or malalignment of the cervical spine.          



               



     2.  Left occipital bone skull base fracture extending to the temporal bone 
into the carotid canal.          



               



               



               



               

 

 Brain/Neck  Brain/Neck  EXAM: CTA BRAIN        -  [facility]



 CTA  CTA      /    -  Medical



     EXAM: CTA NECK        This report was dictated by a Radiology Resident/
Fellow.  I have personally reviewed the images as  Center



             well as the Resident's interpretation and agree with the findings.
  



     EXAM: CT VENOGRAM OF THE HEAD        Read by:  Sesar Tam MD       
          Resident:  Sesar Tam MD  



             Dictated Date/time:  18 19:45  



             Electronically Signed by:  Felton Hill MD                      
   18 22:35  



             FINAL REPORT  



     DATE: 2018 7:12 PM CDT          



               



               



               



     INDICATION: Head bleed; skull base fractures.          



               



               



               



     COMPARISON: Same day CT brain and CT C-spine          



               



               



               



     TECHNIQUE:          



               



     - Rapid acquisition spiral CT images of the brain and neck were obtained 
between the aortic arch and the cranial vertex during intravenous infusion of 
iodinated contrast for the purposes of CT angiograp          



     hy. 3-D CT angiographic images are created using MIP technique at the 
acquisition workstation. The source images are also presented for 
interpretation.          



               



     - Rapid acquisition spiral CT images of the brain were obtained between 
the skull base and the cranial vertex during intravenous infusion of iodinated 
contrast for the purposes of CT venography as well.          



               



     IV contrast: 100 mL Visipaque          



               



     DLP: 675 mGy-cm          



               



               



               



     COMPARISON: Prior CT Scan of the Head, C-Spine, Chest dated 2018     
     



               



               



               



     FINDINGS:          



               



               



               



     CT ANGIOGRAM OF THE NECK:          



               



               



               



     AORTIC ARCH: Left sided aortic arch. Great vessels originate from the 
aortic arch in the standard configuration. Scattered calcified atherosclerotic 
plaques within visualized aortic arch. Origins of the          



      great vessels appear patent without hemodynamically significant stenosis.
          



               



               



               



     CERVICAL CAROTID ARTERIES:          



               



               



               



     RIGHT: Common carotid artery has a normal course, caliber without 
hemodynamically significant stenosis.          



               



               



               



     Common carotid artery bifurcates at level of C3-C4.          



               



               



               



     There are no significant atherosclerotic plaques without hemodynamically 
significant stenosis at the carotid bifurcation/carotid bulb.          



               



               



               



     Remaining cervical internal carotid artery has a normal course and caliber 
without hemodynamically significant stenosis.          



               



               



               



     LEFT: Common carotid artery has a normal course, caliber without 
hemodynamically significant stenosis.          



               



               



               



     Common carotid artery bifurcates at level of C2.          



               



               



               



     There are no significant atherosclerotic plaques without hemodynamically 
significant stenosis at the carotid bifurcation/carotid bulb.          



               



               



               



     Remaining cervical internal carotid artery has a normal course and caliber 
without hemodynamically significant stenosis.          



               



               



               



     CERVICAL VERTEBRAL ARTERIES: Codominant vertebral arteries.          



               



               



               



     RIGHT: Originates from right subclavian artery. No hemodynamically 
significant stenosis at origin. It enters the vertebral foramina at level of 
C6. It has a normal caliber without hemodynamically significant stenosis.      
    



               



               



               



     LEFT: Originates from left subclavian artery. No hemodynamically 
significant stenosis at origin. It enters the vertebral foramina at level of 
C6. It has a normal caliber without hemodynamically significant stenosis.      
    



               



               



               



     CT ANGIOGRAM OF THE HEAD:          



               



               



               



     ANTERIOR CIRCULATION:          



               



               



               



     Right Internal Carotid Artery: There are mild calcified atherosclerotic 
plaques within cavernous and mild to moderate atherosclerotic plaques within 
supraclinoid segments of internal carotid artery. No          



     hemodynamically significant stenosis is present.          



               



               



               



     Left Internal Carotid Artery: There are mild calcified atherosclerotic 
plaques within cavernous and supraclinoid segments of internal carotid artery. 
No hemodynamically significant stenosis is present.          



               



               



               



     Anterior Cerebral Arteries: Visualized anterior cerebral arteries appear 
widely patent without hemodynamically significant stenosis.          



               



               



               



     Middle Cerebral Arteries: Visualized middle cerebral arteries appear 
widely patent without hemodynamically significant stenosis.          



               



               



               



     POSTERIOR CIRCULATION/VERTEBROBASILAR SYSTEM:          



               



               



               



     Intracranial Vertebral Arteries: Intracranial vertebral arteries (V4 
segments) appear widely patent without hemodynamically significant stenosis.   
       



               



               



               



     Basilar Artery: Visualized basilar artery appears widely patent without 
hemodynamically significant stenosis.          



               



               



               



     Posterior Cerebral Arteries: Visualized posterior cerebral arteries appear 
widely patent without hemodynamically significant stenosis. Small posterior 
communicating artery (PCOM) is visualized bilaterally.          



               



               



               



     VENOUS SYSTEM: Mass effect over the torcula and proximal left greater then 
right transverse sinus from epidural hematoma at the midline occipital 
convexity. Linear filling defect within the distal left          



     transverse sinus could be related to an arachnoid granulation. Overall no 
significant venous outflow obstruction or definite evidence of venous sinus 
thrombosis.          



               



               



               



     EXTRAVASCULAR FINDINGS: Please refer to detailed report of the CT scan of 
the head, cervical spine, chest for non-vascular intracranial findings.        
  



               



               



               



     IMPRESSION:          



               



     1. No definite evidence of flow-limiting stenosis, major branch occlusion, 
vascular injury or aneurysm is identified.          



               



     2. Mass effect over the torcula and proximal left greater then right 
transverse sinus from epidural hematoma at the midline occipital convexity. 
Overall no significant venous outflow obstruction or definite evidence of 
venous sinus thrombosis.          



               



               



               



     All quantitative and qualitative measurements of the carotid arteries in 
the neck are performed utilizing the distal internal carotid artery for 
reference as per standard NASCET criteria.          



               



               



               



     These findings are in agreement with previous preliminary report made by 
on call radiology resident.          



               



               



               



               

 

 Chest/Abdom  Chest/Abdomen  EXAM: CT CHEST WITH CONTRAST        -  [facility]



 en/Pelvis w  /Pelvis w IV      /    -  Medical



 IV contrast  contrast CT  EXAM: CT ABDOMEN AND PELVIS WITH CONTRAST        
This report was dictated by a Radiology Resident/Fellow.  I have personally 
reviewed the images as  Center



 CT            well as the Resident's interpretation and agree with the 
findings.  



             Read by:  Marci Whitley MD        Resident:  Marci Whitley MD  



             Dictated Date/time:  18 19:24  



     DATE: 2018 6:27 PM CDT        Electronically Signed by:  Grzegorz Jade MD                   18 20:54  



             FINAL REPORT  



               



               



     INDICATION:  - Trauma          



               



               



               



     COMPARISON: None          



               



               



               



     TECHNIQUE: Volumetric CT acquisition of the chest, abdomen and pelvis 
following intravenous administration of contrast. Delayed imaging was then 
performed through the abdomen and pelvis, using a radiati          



     on reduction technique. Axial, coronal and sagittal reformats.          



               



     Contrast phases: Venous and delayed          



               



     IV contrast: 100 mL of Visipaque 320          



               



     Oral contrast: None.          



               



     DLP: 2326 mGy-cm          



               



     UT SECTION: ER          



               



               



               



     FINDINGS:          



               



               



               



     Lines and tubes: None.          



               



               



               



     Lower Neck: Supraclavicular soft tissues are unremarkable.          



               



               



               



     Thoracic Aorta and Mediastinum: No mediastinal hematoma or thoracic aortic 
injury. Normal heart and pericardium.          



               



               



               



     Lungs, Pleura, Diaphragm: There is biapical scarring with bronchiectasis 
most prominent in the left upper lobe. Tree-in-bud opacities are seen in the 
posterior lower lobes and posterior right upper lobe          



     . No pleural effusion or pneumothorax. No diaphragmatic injury.          



               



               



               



     Liver and biliary tree: No liver injury. Too small to characterize 
hypodensity in the left lobe of the liver. Mild dilation of the common bile duct
, measuring approximately 6.6 mm. No injury.          



               



               



               



     Gallbladder: Normal. No injury.          



               



               



               



     Pancreas: Normal. No injury.          



               



               



               



     Spleen: Normal. No injury.          



               



               



               



     Adrenals: Normal. No injury.          



               



               



               



     Kidneys and ureters: Normal. No injury.          



               



               



               



     Bladder: Normal. No injury.          



               



               



               



     Reproductive organs: No injury.          



               



               



               



     Gastrointestinal tract: Normal. No bowel injury. Normal appendix.          



               



               



               



     Peritoneum and retroperitoneum: No fluid collections or free air.          



               



               



               



     Lymph nodes: Normal.          



               



               



               



     Vasculature: No vascular injury.          



               



               



               



     Spine/ Bones: No acute abnormality of the spine. No other bony injury.    
      



               



               



               



     Soft tissues: Bilateral mastectomy with dystrophic calcification in the 
left anterior chest wall.          



               



               



               



     IMPRESSION:          



               



     1.  Tree-in-bud opacities in the posterior lower lobes and posterior right 
upper lobe likely represent aspiration/pneumonia.          



               



     2.  No acute traumatic injury in the chest, abdomen, or pelvis.          



               



               



               



               

 

 Brain CTV  Brain CTV  EXAM: CTA BRAIN        -   [place]



         /2018    -  Medical



     EXAM: CTA NECK        This report was dictated by a Radiology Resident/
Fellow.  I have personally reviewed the images as  Center



             well as the Resident's interpretation and agree with the findings.
  



     EXAM: CT VENOGRAM OF THE HEAD        Read by:  Sesar Tam MD       
          Resident:  Sesar Tam MD  



             Dictated Date/time:  18 20:04  



             Electronically Signed by:  Felton Hill MD                      
   18 22:35  



             FINAL REPORT  



     DATE: 2018 7:12 PM CDT          



               



               



               



     INDICATION: Head bleed; skull base fractures.          



               



               



               



     COMPARISON: Same day CT brain and CT C-spine          



               



               



               



     TECHNIQUE:          



               



     - Rapid acquisition spiral CT images of the brain and neck were obtained 
between the aortic arch and the cranial vertex during intravenous infusion of 
iodinated contrast for the purposes of CT angiograp          



     hy. 3-D CT angiographic images are created using MIP technique at the 
acquisition workstation. The source images are also presented for 
interpretation.          



               



     - Rapid acquisition spiral CT images of the brain were obtained between 
the skull base and the cranial vertex during intravenous infusion of iodinated 
contrast for the purposes of CT venography as well.          



               



     IV contrast: 100 mL Visipaque          



               



     DLP: 675 mGy-cm          



               



               



               



     COMPARISON: Prior CT Scan of the Head, C-Spine, Chest dated 2018     
     



               



               



               



     FINDINGS:          



               



               



               



     CT ANGIOGRAM OF THE NECK:          



               



               



               



     AORTIC ARCH: Left sided aortic arch. Great vessels originate from the 
aortic arch in the standard configuration. Scattered calcified atherosclerotic 
plaques within visualized aortic arch. Origins of the          



      great vessels appear patent without hemodynamically significant stenosis.
          



               



               



               



     CERVICAL CAROTID ARTERIES:          



               



               



               



     RIGHT: Common carotid artery has a normal course, caliber without 
hemodynamically significant stenosis.          



               



               



               



     Common carotid artery bifurcates at level of C3-C4.          



               



               



               



     There are no significant atherosclerotic plaques without hemodynamically 
significant stenosis at the carotid bifurcation/carotid bulb.          



               



               



               



     Remaining cervical internal carotid artery has a normal course and caliber 
without hemodynamically significant stenosis.          



               



               



               



     LEFT: Common carotid artery has a normal course, caliber without 
hemodynamically significant stenosis.          



               



               



               



     Common carotid artery bifurcates at level of C2.          



               



               



               



     There are no significant atherosclerotic plaques without hemodynamically 
significant stenosis at the carotid bifurcation/carotid bulb.          



               



               



               



     Remaining cervical internal carotid artery has a normal course and caliber 
without hemodynamically significant stenosis.          



               



               



               



     CERVICAL VERTEBRAL ARTERIES: Codominant vertebral arteries.          



               



               



               



     RIGHT: Originates from right subclavian artery. No hemodynamically 
significant stenosis at origin. It enters the vertebral foramina at level of 
C6. It has a normal caliber without hemodynamically significant stenosis.      
    



               



               



               



     LEFT: Originates from left subclavian artery. No hemodynamically 
significant stenosis at origin. It enters the vertebral foramina at level of 
C6. It has a normal caliber without hemodynamically significant stenosis.      
    



               



               



               



     CT ANGIOGRAM OF THE HEAD:          



               



               



               



     ANTERIOR CIRCULATION:          



               



               



               



     Right Internal Carotid Artery: There are mild calcified atherosclerotic 
plaques within cavernous and mild to moderate atherosclerotic plaques within 
supraclinoid segments of internal carotid artery. No          



     hemodynamically significant stenosis is present.          



               



               



               



     Left Internal Carotid Artery: There are mild calcified atherosclerotic 
plaques within cavernous and supraclinoid segments of internal carotid artery. 
No hemodynamically significant stenosis is present.          



               



               



               



     Anterior Cerebral Arteries: Visualized anterior cerebral arteries appear 
widely patent without hemodynamically significant stenosis.          



               



               



               



     Middle Cerebral Arteries: Visualized middle cerebral arteries appear 
widely patent without hemodynamically significant stenosis.          



               



               



               



     POSTERIOR CIRCULATION/VERTEBROBASILAR SYSTEM:          



               



               



               



     Intracranial Vertebral Arteries: Intracranial vertebral arteries (V4 
segments) appear widely patent without hemodynamically significant stenosis.   
       



               



               



               



     Basilar Artery: Visualized basilar artery appears widely patent without 
hemodynamically significant stenosis.          



               



               



               



     Posterior Cerebral Arteries: Visualized posterior cerebral arteries appear 
widely patent without hemodynamically significant stenosis. Small posterior 
communicating artery (PCOM) is visualized bilaterally.          



               



               



               



     VENOUS SYSTEM: Mass effect over the torcula and proximal left greater then 
right transverse sinus from epidural hematoma at the midline occipital 
convexity. Linear filling defect within the distal left          



     transverse sinus could be related to an arachnoid granulation. Overall no 
significant venous outflow obstruction or definite evidence of venous sinus 
thrombosis.          



               



               



               



     EXTRAVASCULAR FINDINGS: Please refer to detailed report of the CT scan of 
the head, cervical spine, chest for non-vascular intracranial findings.        
  



               



               



               



     IMPRESSION:          



               



     1. No definite evidence of flow-limiting stenosis, major branch occlusion, 
vascular injury or aneurysm is identified.          



               



     2. Mass effect over the torcula and proximal left greater then right 
transverse sinus from epidural hematoma at the midline occipital convexity. 
Overall no significant venous outflow obstruction or definite evidence of 
venous sinus thrombosis.          



               



               



               



     All quantitative and qualitative measurements of the carotid arteries in 
the neck are performed utilizing the distal internal carotid artery for 
reference as per standard NASCET criteria.          



               



               



               



     These findings are in agreement with previous preliminary report made by 
on call radiology resident.          



               



               



               



               

 

 Chest 1view  Chest 1view  EXAM: XR CHEST 1 VIEW        -  [facility]



 DX      -  [facility]



               



     DATE: 2018 6:20 PM CDT        Read by:  Grzegorz Jade MD  



             Dictated Date/time:  18 18:33  



             Electronically Signed by:  Grzegorz Jade MD                
   18 18:34  



             FINAL REPORT  



     INDICATION:  - Trauma          



               



               



               



     COMPARISON: None          



               



               



               



     TECHNIQUE: AP chest          



               



               



               



     FINDINGS:          



               



               



               



     Lines and tubes: None.          



               



               



               



     Lungs and pleura: No pulmonary or pleural based abnormality is identified.
          



               



               



               



     Heart and mediastinum: Unremarkable.          



               



               



               



     Chest wall: Unremarkable.          



               



               



               



     Bones: No acute bony abnormality is identified.          



               



               



               



     IMPRESSION:  No acute cardiopulmonary abnormality.          



               



               



               



               







Vital Signs







 Vital Sign  Value  Date  Comments  Source



         

 

 Temperature Oral (F)  98.0 F  2018    [facility]



         

 

 Heart Rate  71  2018    [facility]



         

 

 Respitory Rate  18  2018    [facility]



         

 

 Systolic (mm Hg)  93  2018    [facility]



         

 

 Diastolic (mm Hg)  49  2018    [facility]



         

 

 Respitory Rate  18  2018    [facility]



         

 

 Temperature Oral (F)  98.7 F  2018    [facility]



         

 

 Heart Rate  61  2018    [facility]



         

 

 Systolic (mm Hg)  103  2018    [facility]



         

 

 Diastolic (mm Hg)  55  2018    [facility]



         

 

 Heart Rate  81  2018    [facility]



         

 

 Temperature Oral (F)  98.6 F  2018    [facility]



         

 

 Respitory Rate  18  2018    [facility]



         

 

 Systolic (mm Hg)  100  2018    [facility]



         

 

 Diastolic (mm Hg)  63  2018    [facility]



         

 

 Height  165.1 cm  2018    [facility]



         

 

 BMI Calculated  25.88  2018    [facility]



         

 

 Weight  72.727  2018    [facility]



         

 

 Height  167.64 cm  2018    [facility]



         







Encounters







 Location  Location  Encounter  Encounter  Reason  Attending  ADM  DC  Status  
Source



   Details  Type  Number  For  Provider  Date  Date    



         Visit          



                   



                   



                   

 

 [facility]  [id number]    Max  06/26  07/10    [facility]



 Braulio Jackson  /2018    [facility]



                   



                   



                   

 

     Outpatient  [id number]     TRAUMA      [facility]  /2018      [place]



                   



                   



                   



                   







Procedures







 Procedure  Code  Date  Perfomer  Comments  Source



           



           

 

 Mastectomy  [id number]        [facility]

## 2018-09-04 NOTE — ER
Nurse's Notes                                                                                     

 [facility]                                                                

Name: Kerri Parra                                                                                

Age: 62 yrs                                                                                       

Sex: Female                                                                                       

: 1955                                                                                   

MRN: [id number]                                                                                   

Arrival Date: 2018                                                                          

Time: 19:14                                                                                       

Account#: [id number]                                                                            

Bed 2                                                                                             

Private MD:                                                                                       

Diagnosis: Acute right frontal intraparenchymal hemorrhage;Traumatic subdural hemorrhage          

                                                                                                  

Presentation:                                                                                     

                                                                                             

19:28 Presenting complaint: pt's daughter states pt may have had a fall earlier today pt is   bb  

      lethargic and is confused spouse states pt was normal at 0630 this morning. Care prior      

      to arrival: None. Mechanism of Injury: Fall possible unwitnessed fall. Trauma event         

      details: Injury occurred in the [facility], Injury occurred: at home. Injury        

      occurred: 2018.                                                               

19:28 Acuity: ALEJANDRO 2                                                                           bb  

19:28 Method Of Arrival: Wheelchair                                                           bb  

19:35 Transition of care: patient was not received from another setting of care. Onset of     bb  

      symptoms was 2018. Risk Assessment: Do you want to hurt yourself or           

      someone else? Patient reports no desire to harm self or others. Initial Sepsis Screen:      

      Does the patient meet any 2 criteria? No. Patient's initial sepsis screen is negative.      

      Does the patient have a suspected source of infection? No. Patient's initial sepsis         

      screen is negative.                                                                         

                                                                                                  

Trauma Activation: Alert                                                                          

 Physician: ED Physician; Name: MINNIE; Notified At:  19:20; Arrived At:                 

   19:20                                                                                

 Physician: General Surgeon; Name: ; Notified At:  19:20; Arrived At:                   

 Physician: Radiology; Name: GRETTA ENGEL; Notified At:  19:20; Arrived             

  At:  19:22                                                                            

 Physician: Respiratory; Name: ; Notified At:  19:20; Arrived At:                       

 Physician: Lab; Name: ; Notified At:  19:20; Arrived At:                               

                                                                                                  

Historical:                                                                                       

- Allergies:                                                                                      

19:37 No Known Allergies;                                                                     bb  

- Home Meds:                                                                                      

19:37 Requip 10 MG Oral 1 tab nightly [Active]; levothyroxine 88 mcg tab 1 tab once daily     bb  

      [Active]; Aricept 10 mg Oral tab 1 tab twice a day [Active];                                

- PMHx:                                                                                           

19:37 Hypothyroidism; BRAIN BLEED;                                                            bb  

                                                                                                  

- Immunization history: Last tetanus immunization: unknown.                                       

- Social history:: Smoking status: Patient/guardian denies using tobacco.                         

- Ebola Screening: : No symptoms or risks identified at this time.                                

- Family history:: not pertinent.                                                                 

- Hospitalizations: : No recent hospitalization is reported.                                      

                                                                                                  

                                                                                                  

Screenin:50 Nutritional screening: No deficits noted. Fall Risk Fall in past 12 months (25 points). aa1 

20:16 Abuse screen: Denies threats or abuse. Denies injuries from another. Tuberculosis       bp  

      screening: No symptoms or risk factors identified.                                          

                                                                                                  

Primary Survey:                                                                                   

20:00 A: Airway: patent. Breathing/Chest: Respiratory pattern: regular, Respiratory effort:   bp  

      spontaneous, unlabored, Breath sounds: clear, bilaterally. Circulation: Skin color:         

      pink, Skin temperature: warm, dry. Disability Verbal Stimuli.                               

20:40 Reassessment Airway Airway Oxygen No O2 Breathing/Chest Respiratory pattern Regular     aa1 

      Respiratory effort Spontaneous Unlabored Breath sounds Clear Chest inspection               

      Symmetrical Circulation Heart tones Present Pulses Palpable Color Pink Temperature Warm     

      Disability Verbal stimuli.                                                                  

                                                                                                  

Secondary Survey:                                                                                 

20:15 HEENT: Head No injury/deformity Face No injury/deformity Eyes: Ecchymosis noted right   bp  

      upper eyelid and right lower eyelid. Ears: clear bilaterally. Nose: clear to bilateral      

      nares. Throat: No injury or deformity noted.                                                

20:16 Gastrointestinal: No deficits noted. Abdomen is soft. : No signs and/or symptoms were bp  

      reported regarding the genitourinary system. Musculoskeletal: No deficits noted.            

                                                                                                  

Assessment:                                                                                       

19:45 Reassessment: Dr Michael at bedside for discussion of findings and recommendations pt to  bb  

      be transferred to [facility] via life flight for further evaluation and               

      treatment. Daughter and spouse verbalized understanding of and agree to plan of care.       

19:48 Reassessment: Pt transferred to room 2 at this time and report received from Cristy Joseph RN.                                                                                   

19:50 Reassessment: Patient appears in no apparent distress at this time. General: Appears    aa1 

      comfortable, Behavior is listless. Pain: Unable to use pain scale. Does not appear to       

      understand pain scale. FLACC scale score is 0 out of 10. Neuro: Level of Consciousness      

      is listless, Will respond with minimal verbal response to questioning. Moves all            

      extremities but does not open eyes. Replies with only "yes" and "ok.". Moves all            

      extremities. Speech is normal, Facial symmetry appears normal, Pupils are pinpoint.         

      Cardiovascular: Heart tones S1 S2 present Rhythm is regular. Respiratory: Airway is         

      patent Respiratory effort is even, unlabored, Respiratory pattern is regular,               

      symmetrical, Breath sounds are clear bilaterally. GI: No signs and/or symptoms were         

      reported involving the gastrointestinal system. Abdomen is non-distended. : No signs      

      and/or symptoms were reported regarding the genitourinary system. EENT: No signs and/or     

      symptoms were reported regarding the EENT system. Derm: Skin is intact, is healthy with     

      good turgor, Skin is pink, warm \T\ dry. Bruising that is dark purple, on left eye.         

      Musculoskeletal: Circulation, motion, and sensation intact. Capillary refill < 3            

      seconds.                                                                                    

20:11 Reassessment: REPORT TO [facility], ETA 15MIN.                                        bp  

20:20 Reassessment: Patient appears in no apparent distress at this time. No changes from     aa1 

      previously documented assessment. Patient and/or family updated on plan of care and         

      expected duration. Pain level reassessed.  and daughter remain at bedside. Pt        

      awaiting transfer to [facility].                                                  

20:40 Reassessment: Patient appears in no apparent distress at this time. No changes from     aa1 

      previously documented assessment. [facility] Flight present for pt transport to [place].          

       present at bedside.                                                                 

                                                                                                  

Vital Signs:                                                                                      

19:28  / 55; Pulse 61; Resp 14 S; Temp 98.3(O); Pulse Ox 100% on R/A; Weight 52.62 kg   bb  

      (R); Height 5 ft. 5 in. (165.10 cm) (R);                                                    

20:15  / 55; Pulse 86; Resp 14; Pulse Ox 100% ;                                         bp  

20:38  / 69; Pulse 78; Resp 14; Temp 99.9; Pulse Ox 100% on R/A; Pain 0/10;             aa1 

20:38                                                                                         aa1 

19:28 Body Mass Index 19.30 (52.62 kg, 165.10 cm)                                             bb  

20:38 Cardene rate increased at this time                                                     aa1 

                                                                                                  

Valentín Coma Score:                                                                               

19:28 Eye Response: to voice(3). Verbal Response: inappropriate words(3). Motor Response:     bb  

      withdraws from pain(4). Total: 10.                                                          

20:20 Eye Response: none(1). Verbal Response: confused(4). Motor Response: localizes pain(5). aa1 

      Total: 10.                                                                                  

                                                                                                  

Trauma Score (Adult):                                                                             

19:28 Eye Response: to voice(0); Verbal Response: inappropriate words(0); Motor Response:     bb  

      withdraws from pain(1); Systolic BP: > 89 mm Hg(4); Respiratory Rate: 10 to 29 per          

      min(4); Valentín Score: 10; Trauma Score: 9                                                  

20:20 Eye Response: none(0); Verbal Response: confused(1); Motor Response: localizes pain(1); aa1 

      Systolic BP: > 89 mm Hg(4); Respiratory Rate: 10 to 29 per min(4); Stonewall Score: 10;       

      Trauma Score: 10                                                                            

                                                                                                  

ED Course:                                                                                        

19:14 Patient arrived in ED.                                                                  al2 

19:19 Mckay Michael MD is Attending Physician.                                                rn  

19:28 Patient has correct armband on for positive identification. Placed in gown. Bed in low  bb  

      position. Call light in reach. Side rails up X2. Adult w/ patient.                          

19:32 Triage completed.                                                                       bb  

19:33 Patient moved to CT via stretcher.                                                      vm2 

19:36 Cristy Joseph, RN is Primary Nurse.                                                       lp1 

19:37 CT completed. Patient moved back from CT.                                               vm2 

19:38 CT Head C Spine In Process Unspecified.                                                 EDMS

19:38 CT Facial Bones W/O Con In Process Unspecified.                                         EDMS

19:50 Thermoregulation: warm blanket given to patient.                                        aa1 

19:51 Inserted saline lock: 20 gauge in left antecubital area, using aseptic technique. Blood lp1 

      collected.                                                                                  

20:00 Inserted saline lock: 20 gauge in right antecubital area, using aseptic technique.      bp  

      Patient maintains SpO2 saturation greater than 95% on room air.                             

20:30 Cleaned of incontinence.                                                                aa1 

20:40 No provider procedures requiring assistance completed. Patient transferred, IV remains  aa1 

      in place.                                                                                   

                                                                                                  

Administered Medications:                                                                         

19:45 Drug: Keppra 1000 mg Route: IV; Rate: calculated rate; Site: left antecubital;          bb  

20:00 Follow up: IV Status: Completed infusion                                                aa1 

20:09 Drug: Cardene 5 mg/hr Route: IV; Rate: calculated rate; Site: right antecubital;        bb  

20:22 Follow up: IV Status: Infusion continued upon transfer                                  aa1 

20:38 Follow up: Rate change 3 mg/hr; rate increased from 2 mg/hr to 3 mg/hr                  aa1 

                                                                                                  

                                                                                                  

Intake:                                                                                           

19:28 PO: 0ml; Total: 0ml.                                                                    bb  

                                                                                                  

Outcome:                                                                                          

20:01 ER care complete, transfer ordered by MD.                                               rn  

20:40 Transferred by helicopter to [facility], Transfer form completed. X-rays sent aa1 

      w/ patient.                                                                                 

20:40 Condition: stable                                                                           

20:40 Discharge instructions given to family, Instructed on the need for transfer,                

      Demonstrated understanding of instructions.                                                 

20:40 Patient's length of stay was not longer than 2 hours.                                       

20:45 Patient left the ED.                                                                    aa1 

                                                                                                  

Signatures:                                                                                       

Dispatcher [employer]                           EDMS                                                 

Namita Tavares RN                        RN   aa1                                                  

Deloris Barrera RN                     RN   bb                                                   

Mckay Michael MD MD rn Pena, Laura RN                         RN   lp1                                                  

Concha Garcia                            2                                                  

Filemon Velarde RN                      RN   Sanna Aparicio2                                                  

                                                                                                  

Corrections: (The following items were deleted from the chart)                                    

21:47 19:50 Neuro: Level of Consciousness is listless, Will respond with minimal verbal       aa1 

      response to questioning. Moves all extremities but does not open eyes. Replies with         

      only "yes" and "ok.". Moves all extremities. Speech is normal, Facial symmetry appears      

      normal, aa1                                                                                 

21:47 19:50 EENT: No signs and/or symptoms were reported regarding the EENT system. aa1       aa1 

                                                                                                  

**************************************************************************************************

## 2018-09-04 NOTE — RAD REPORT
EXAM DESCRIPTION:  CT - Facial Bones W/ Mpr - 9/4/2018 7:37 pm

 

CLINICAL HISTORY:  Fall, facial injury

 

COMPARISON:  None.

 

TECHNIQUE:  Axial 2 millimeter thick images of the facial bones were obtained with sagittal and coron
al reconstruction imaging.

 

All CT scans are performed using dose optimization technique as appropriate and may include automated
 exposure control or mA/KV adjustment according to patient size.

 

FINDINGS:  Condyles of the mandible are normally positioned. No mandible fracture identified. Mastoid
 air cells are clear. No air-fluid level in the paranasal sinuses. No globe or orbital content abnorm
ality. No facial bone fracture is identifiable. No significant soft tissue injury. No air or foreign 
body in the soft tissues.

 

IMPRESSION:  No facial fracture. No significant soft tissue finding.

## 2018-09-04 NOTE — EDPHYS
Physician Documentation                                                                           

 [facility]                                                                

Name: Kerri Parra                                                                                

Age: 62 yrs                                                                                       

Sex: Female                                                                                       

: 1955                                                                                   

MRN: [id number]                                                                                   

Arrival Date: 2018                                                                          

Time: 19:14                                                                                       

Account#: [id number]                                                                            

Bed 2                                                                                             

Private MD:                                                                                       

ED Physician Mckay Michael                                                                         

HPI:                                                                                              

                                                                                             

19:51 This 62 yrs old  Female presents to ER via Wheelchair with complaints of Fall  rn  

      Injury.                                                                                     

19:51 Details of fall: The patient fell. Onset: The symptoms/episode began/occurred at an     rn  

      unknown time. Associated injuries: The patient sustained injury to the head. Severity       

      of symptoms: At their worst the symptoms were moderate, in the emergency department the     

      symptoms are unchanged. The patient has experienced a previous episode. Unknown             

      details, but daughter has video camera at house, noticed strange walk around 10 AM          

      today, when got home noticed black eye, + vomiting, has had fall with brain bleed           

      before so brought her in, not acting right, no blood thinners. .                            

                                                                                                  

Historical:                                                                                       

- Allergies:                                                                                      

19:37 No Known Allergies;                                                                     bb  

- Home Meds:                                                                                      

19:37 Requip 10 MG Oral 1 tab nightly [Active]; levothyroxine 88 mcg tab 1 tab once daily     bb  

      [Active]; Aricept 10 mg Oral tab 1 tab twice a day [Active];                                

- PMHx:                                                                                           

19:37 Hypothyroidism; BRAIN BLEED;                                                            bb  

                                                                                                  

- Immunization history: Last tetanus immunization: unknown.                                       

- Social history:: Smoking status: Patient/guardian denies using tobacco.                         

- Ebola Screening: : No symptoms or risks identified at this time.                                

- Family history:: not pertinent.                                                                 

- Hospitalizations: : No recent hospitalization is reported.                                      

                                                                                                  

                                                                                                  

ROS:                                                                                              

19:51 Constitutional: Negative for fever, chills, and weight loss, Eyes: + facial pain Neck:  rn  

      Negative for injury, pain, and swelling, Cardiovascular: Negative for chest pain,           

      palpitations, and edema, Respiratory: Negative for shortness of breath, cough,              

      wheezing, and pleuritic chest pain, Abdomen/GI: Negative for abdominal pain, diarrhea,      

      and constipation, Back: Negative for injury and pain, MS/Extremity: Negative for injury     

      and deformity, Neuro: Negative for headache, weakness, numbness, tingling, and seizure.     

                                                                                                  

Exam:                                                                                             

19:53 Constitutional:  This is a well developed, well nourished patient who is awake, slow to rn  

      respond but follows commands Head/Face:  Normocephalic, + left periorbital ecchymosis       

      Eyes:  Pupils 3mm, reactive, full EOMI Neck:  Trachea midline, no thyromegaly or masses     

      palpated, and no cervical lymphadenopathy.  Supple, full range of motion without nuchal     

      rigidity, or vertebral point tenderness.  No Meningismus. Cardiovascular:  Regular rate     

      and rhythm with a normal S1 and S2.  No gallops, murmurs, or rubs.  Normal PMI, no JVD.     

       No pulse deficits. Respiratory:  Lungs have equal breath sounds bilaterally, clear to      

      auscultation and percussion.  No rales, rhonchi or wheezes noted.  No increased work of     

      breathing, no retractions or nasal flaring. Abdomen/GI:  Soft, No evidence of               

      tenderness throughout. MS/ Extremity:  Pulses equal, no cyanosis.  Neurovascular            

      intact.  Full, normal range of motion.  Equal circumference. Neuro:  Awake, GCS 15,         

      oriented to person, place, and situation, not time.  Cranial nerves II-XII grossly          

      intact.  Motor strength 5/5 in all extremities.  Sensory grossly intact.                    

                                                                                                  

Vital Signs:                                                                                      

19:28  / 55; Pulse 61; Resp 14 S; Temp 98.3(O); Pulse Ox 100% on R/A; Weight 52.62 kg   bb  

      (R); Height 5 ft. 5 in. (165.10 cm) (R);                                                    

20:15  / 55; Pulse 86; Resp 14; Pulse Ox 100% ;                                         bp  

20:38  / 69; Pulse 78; Resp 14; Temp 99.9; Pulse Ox 100% on R/A; Pain 0/10;             aa1 

20:38                                                                                         aa1 

19:28 Body Mass Index 19.30 (52.62 kg, 165.10 cm)                                               

20:38 Cardene rate increased at this time                                                     aa1 

                                                                                                  

[place] Coma Score:                                                                               

19:28 Eye Response: to voice(3). Verbal Response: inappropriate words(3). Motor Response:     bb  

      withdraws from pain(4). Total: 10.                                                          

20:20 Eye Response: none(1). Verbal Response: confused(4). Motor Response: localizes pain(5). aa1 

      Total: 10.                                                                                  

                                                                                                  

Trauma Score (Adult):                                                                             

19:28 Eye Response: to voice(0); Verbal Response: inappropriate words(0); Motor Response:     bb  

      withdraws from pain(1); Systolic BP: > 89 mm Hg(4); Respiratory Rate: 10 to 29 per          

      min(4); [place] Score: 10; Trauma Score: 9                                                  

20:20 Eye Response: none(0); Verbal Response: confused(1); Motor Response: localizes pain(1); aa1 

      Systolic BP: > 89 mm Hg(4); Respiratory Rate: 10 to 29 per min(4); Valentín Score: 10;       

      Trauma Score: 10                                                                            

                                                                                                  

MDM:                                                                                              

19:19 Patient medically screened.                                                             rn  

19:50 ED course: Organizing transfer to [facility], currently awake and alert, not      rn  

      oriented, but been told has alzheimer's, + head bleed, no blood thinners, cardene and       

      keppra started. Updated family..                                                            

19:59 Differential diagnosis: closed head injury, contusion, fracture. Data reviewed: vital   rn  

      signs, nurses notes, radiologic studies, CT scan, and as a result, I will admit             

      patient. Counseling: I had a detailed discussion with the patient and/or guardian           

      regarding: the historical points, exam findings, and any diagnostic results supporting      

      the discharge/admit diagnosis, radiology results, the need to transfer to another           

      facility, for higher level of care, [facility] does not immediately       

      have the required specialist. ED course: Pt accepted for transfer to [facility]       

      by Afua Bruno. .                                                                          

                                                                                                  

                                                                                             

19:26 Order name: Basic Metabolic Panel                                                       rn  

                                                                                             

19:26 Order name: CBC with Diff                                                               rn  

                                                                                             

19:26 Order name: Ckmb                                                                        rn  

                                                                                             

19:26 Order name: CPK                                                                         rn  

                                                                                             

19:26 Order name: CT Head C Spine; Complete Time: 19:55                                       bb  

                                                                                             

19:26 Order name: Protime (+inr)                                                              rn  

                                                                                             

19:26 Order name: Ptt, Activated                                                              rn  

                                                                                             

19:26 Order name: Troponin (emerg Dept Use Only)                                              rn  

                                                                                             

19:26 Order name: EKG; Complete Time: 19:27                                                   rn  

                                                                                             

19:30 Order name: CT Facial Bones W/O Con; Complete Time: 19:55                               rn  

                                                                                             

19:26 Order name: Cardiac monitoring; Complete Time: 19:48                                    rn  

                                                                                             

19:26 Order name: EKG - Nurse/Tech; Complete Time: 19:48                                      rn  

                                                                                             

19:26 Order name: IV Saline Lock; Complete Time: 19:48                                        rn  

                                                                                             

19:26 Order name: Labs collected and sent; Complete Time: 19:48                               rn  

                                                                                             

19:26 Order name: NPO; Complete Time: 19:49                                                   rn  

                                                                                             

19:26 Order name: O2 Per Protocol; Complete Time: 19:49                                       rn  

                                                                                             

19:26 Order name: O2 Sat Monitoring; Complete Time: 19:49                                     rn  

                                                                                                  

Administered Medications:                                                                         

19:45 Drug: Keppra 1000 mg Route: IV; Rate: calculated rate; Site: left antecubital;          bb  

20:00 Follow up: IV Status: Completed infusion                                                aa1 

20:09 Drug: Cardene 5 mg/hr Route: IV; Rate: calculated rate; Site: right antecubital;        bb  

20:22 Follow up: IV Status: Infusion continued upon transfer                                  aa1 

20:38 Follow up: Rate change 3 mg/hr; rate increased from 2 mg/hr to 3 mg/hr                  aa1 

                                                                                                  

                                                                                                  

Disposition:                                                                                      

18 20:01 Transfer ordered to [facility]. Diagnosis are Acute     

  right frontal intraparenchymal hemorrhage, Traumatic subdural hemorrhage.                       

- Reason for transfer: Higher level of care.                                                      

- Accepting physician is Dr. Bruno.                                                            

- Condition is Fair.                                                                              

- Problem is new.                                                                                 

- Symptoms are unchanged.                                                                         

                                                                                                  

                                                                                                  

                                                                                                  

Signatures:                                                                                       

Dispatcher [employer]                           Namita Cortez RN                        RN   aa1                                                  

Deloris Barrera RN                     RN   bb                                                   

Mckay Michael MD MD   rn                                                   

                                                                                                  

Corrections: (The following items were deleted from the chart)                                    

20:45 20:01 2018 20:01 Transfer ordered to [facility].       aa1 

      Diagnosis is Acute right frontal intraparenchymal hemorrhage; Traumatic subdural            

      hemorrhage. Reason for transfer: Higher level of care. Accepting physician is Dr. Bruno. Condition is Fair. Problem is new. Symptoms are unchanged. rn                     

                                                                                                  

**************************************************************************************************

## 2018-09-05 NOTE — EKG
Test Date:    2018-09-04               Test Time:    19:53:56

Technician:   ALEXANDR                                     

                                                     

MEASUREMENT RESULTS:                                       

Intervals:                                           

Rate:         77                                     

NY:           148                                    

QRSD:         100                                    

QT:           446                                    

QTc:          504                                    

Axis:                                                

P:            72                                     

NY:           148                                    

QRS:          5                                      

T:            71                                     

                                                     

INTERPRETIVE STATEMENTS:                                       

                                                     

Electronic ventricular pacemaker

Compared to ECG 02/09/1992 00:35:00

Sinus rhythm no longer present



Electronically Signed On 09-05-18 17:51:08 CDT by Joseph Roberts

## 2019-01-15 ENCOUNTER — HOSPITAL ENCOUNTER (EMERGENCY)
Dept: HOSPITAL 97 - ER | Age: 64
Discharge: HOME | End: 2019-01-15
Payer: COMMERCIAL

## 2019-01-15 DIAGNOSIS — R53.83: ICD-10-CM

## 2019-01-15 DIAGNOSIS — D72.829: Primary | ICD-10-CM

## 2019-01-15 DIAGNOSIS — R94.31: ICD-10-CM

## 2019-01-15 DIAGNOSIS — Z85.3: ICD-10-CM

## 2019-01-15 DIAGNOSIS — I69.851: ICD-10-CM

## 2019-01-15 LAB
ALBUMIN SERPL BCP-MCNC: 3.8 G/DL (ref 3.4–5)
ALP SERPL-CCNC: 89 U/L (ref 45–117)
ALT SERPL W P-5'-P-CCNC: 23 U/L (ref 12–78)
AST SERPL W P-5'-P-CCNC: 18 U/L (ref 15–37)
BUN BLD-MCNC: 12 MG/DL (ref 7–18)
GLUCOSE SERPLBLD-MCNC: 101 MG/DL (ref 74–106)
HCT VFR BLD CALC: 45 % (ref 36–45)
INR BLD: 1.07
LYMPHOCYTES # SPEC AUTO: 1 K/UL (ref 0.7–4.9)
MAGNESIUM SERPL-MCNC: 2.2 MG/DL (ref 1.8–2.4)
NT-PROBNP SERPL-MCNC: 345 PG/ML (ref ?–125)
PMV BLD: 9 FL (ref 7.6–11.3)
POTASSIUM SERPL-SCNC: 3.6 MMOL/L (ref 3.5–5.1)
RBC # BLD: 5.13 M/UL (ref 3.86–4.86)
TROPONIN (EMERG DEPT USE ONLY): < 0.02 NG/ML (ref 0–0.04)
UA COMPLETE W REFLEX CULTURE PNL UR: (no result)

## 2019-01-15 PROCEDURE — 85610 PROTHROMBIN TIME: CPT

## 2019-01-15 PROCEDURE — 96361 HYDRATE IV INFUSION ADD-ON: CPT

## 2019-01-15 PROCEDURE — 36415 COLL VENOUS BLD VENIPUNCTURE: CPT

## 2019-01-15 PROCEDURE — 80048 BASIC METABOLIC PNL TOTAL CA: CPT

## 2019-01-15 PROCEDURE — 80076 HEPATIC FUNCTION PANEL: CPT

## 2019-01-15 PROCEDURE — 81015 MICROSCOPIC EXAM OF URINE: CPT

## 2019-01-15 PROCEDURE — 83735 ASSAY OF MAGNESIUM: CPT

## 2019-01-15 PROCEDURE — 96374 THER/PROPH/DIAG INJ IV PUSH: CPT

## 2019-01-15 PROCEDURE — 99284 EMERGENCY DEPT VISIT MOD MDM: CPT

## 2019-01-15 PROCEDURE — 85025 COMPLETE CBC W/AUTO DIFF WBC: CPT

## 2019-01-15 PROCEDURE — 71045 X-RAY EXAM CHEST 1 VIEW: CPT

## 2019-01-15 PROCEDURE — 83880 ASSAY OF NATRIURETIC PEPTIDE: CPT

## 2019-01-15 PROCEDURE — 93005 ELECTROCARDIOGRAM TRACING: CPT

## 2019-01-15 PROCEDURE — 84443 ASSAY THYROID STIM HORMONE: CPT

## 2019-01-15 PROCEDURE — 81003 URINALYSIS AUTO W/O SCOPE: CPT

## 2019-01-15 PROCEDURE — 84484 ASSAY OF TROPONIN QUANT: CPT

## 2019-01-15 PROCEDURE — 51702 INSERT TEMP BLADDER CATH: CPT

## 2019-01-15 PROCEDURE — 83605 ASSAY OF LACTIC ACID: CPT

## 2019-01-15 PROCEDURE — 82962 GLUCOSE BLOOD TEST: CPT

## 2019-01-15 PROCEDURE — 87040 BLOOD CULTURE FOR BACTERIA: CPT

## 2019-01-15 PROCEDURE — 87804 INFLUENZA ASSAY W/OPTIC: CPT

## 2019-01-15 NOTE — EKG
Test Date:    2019-01-15               Test Time:    15:25:41

Technician:   HELIO                                     

                                                     

MEASUREMENT RESULTS:                                       

Intervals:                                           

Rate:         99                                     

NM:           150                                    

QRSD:         82                                     

QT:           360                                    

QTc:          462                                    

Axis:                                                

P:            73                                     

NM:           150                                    

QRS:          -33                                    

T:            61                                     

                                                     

INTERPRETIVE STATEMENTS:                                       

                                                     

Normal sinus rhythm

Possible Left atrial enlargement

Left axis deviation

Septal infarct, age undetermined

Abnormal ECG

Compared to ECG 09/04/2018 19:53:56

Left-axis deviation now present

Myocardial infarct finding now present

Ventricular-paced complex(es) or rhythm no longer present



Electronically Signed On 01-15-19 18:19:11 CST by Joseph Roberts

## 2019-01-15 NOTE — ER
Nurse's Notes                                                                                     

 [facility]                                                                

Name: Kerri Parra                                                                                

Age: 63 yrs                                                                                       

Sex: Female                                                                                       

: 1955                                                                                   

MRN: [id number]                                                                                   

Arrival Date: 01/15/2019                                                                          

Time: 13:44                                                                                       

Account#: [id number]                                                                            

Bed 8                                                                                             

Private MD: DANA COLES                                                                          

Diagnosis: Fatigue;Leukocytosis                                                                   

                                                                                                  

Presentation:                                                                                     

01/15                                                                                             

13:57 Presenting complaint: Child states: Last seen normal approx 48 hrs ago, noticed today   sg  

      that the confusion and disorientation has continued to get worse, pt baseline is aa\T\ox2   

      due to traumatic brain injury and CVA, report shes had decreased appetite as well.          

      Transition of care: patient was not received from another setting of care. An acute         

      neurological deficit is present. The patient has been moved to a treatment area. Onset      

      of symptoms was January 15, 2019. Risk Assessment: Do you want to hurt yourself or          

      someone else? Patient reports no desire to harm self or others. Initial Sepsis Screen:      

      Does the patient meet any 2 criteria? HR > 90 bpm. Does the patient have a suspected        

      source of infection? Yes: Dysuria/Frequency/Urgency/UTI. Care prior to arrival: None.       

13:57 Method Of Arrival: Wheelchair                                                           sg  

13:57 Acuity: ALEJANDRO 2                                                                           sg  

                                                                                                  

Stroke Activation: Symptom onset > 6 hours                                                        

 Physician: Stroke Attending; Name: ; Notified At: ; Arrived At:                                  

 Physician: Chief Stroke Resident; Name: ; Notified At: ; Arrived At:                             

 Physician: Stroke Resident; Name: ; Notified At: ; Arrived At:                                   

 Physician: ED Attending; Name: ; Notified At: ; Arrived At:                                      

 Physician: ED Resident; Name: ; Notified At: ; Arrived At:                                       

                                                                                                  

Historical:                                                                                       

- Allergies:                                                                                      

13:59 No Known Allergies;                                                                     sg  

- PMHx:                                                                                           

13:48 Brain bleed; breast cancer; Hypothyroidism; traumatic brain injury;                     sg  

- PSHx:                                                                                           

13:48 Mastectomy;                                                                             sg  

                                                                                                  

- Immunization history:: Adult Immunizations not up to date.                                      

- Social history:: Smoking status: Patient/guardian denies using tobacco.                         

- Ebola Screening: : Patient negative for fever greater than or equal to 101.5 degrees            

  Fahrenheit, and additional compatible Ebola Virus Disease symptoms Patient denies               

  exposure to infectious person Patient denies travel to an Ebola-affected area in the            

  21 days before illness onset No symptoms or risks identified at this time.                      

                                                                                                  

                                                                                                  

Screening:                                                                                        

15:10 Abuse screen: Denies threats or abuse. Denies injuries from another.                    ca1 

15:10 Nutritional screening: No deficits noted. Tuberculosis screening: No symptoms or risk   ca1 

      factors identified. Fall Risk Secondary diagnosis (15 points) impaired mobility, IV         

      access (20 points). Ambulatory Aid- Crutches/Cane/Walker (15 pts).                          

                                                                                                  

Assessment:                                                                                       

15:10 General: Appears in no apparent distress. Behavior is calm, cooperative.                ca1 

15:10 Pain: Pain began When nurse asked if patient was in pain, where the patient answered    ca1 

      no, the daughter at bedside states, "you cannot rely on her to get accurate and             

      appropriate answers to your questions". Neuro: Level of Consciousness is awake, alert,      

      confused, Oriented to  are weak bilaterally Weakness in left hand(s) leg(s) Facial     

      symmetry appears normal, Pupils are PERRLA. Cardiovascular: Heart tones S1 S2 present       

      Capillary refill < 3 seconds Patient's skin is warm and dry. Respiratory: Airway is         

      patent Trachea midline Respiratory effort is even, unlabored, Respiratory pattern is        

      regular, symmetrical, Breath sounds are clear bilaterally. GI: Abdomen is flat,             

      non-distended, Bowel sounds present X 4 quads. Abd is soft and non tender X 4 quads.        

      : No signs and/or symptoms were reported regarding the genitourinary system. EENT: No     

      signs and/or symptoms were reported regarding the EENT system. Derm: Skin is intact, is     

      healthy with good turgor, Skin is pink, warm \T\ dry. Musculoskeletal: Capillary refill <   

      3 seconds.                                                                                  

16:10 Reassessment: Patient appears in no apparent distress at this time. Patient and/or      ca1 

      family updated on plan of care and expected duration. Pain level reassessed.                

16:50 Reassessment: VO for straight cath obtained. Pt cleaned of urinary incontinence, clean  aa5 

      brief applied. .                                                                            

17:20 Reassessment: Patient appears in no apparent distress at this time.  at bedside. ca1 

      Dr. Johnson at bedside with family.                                                          

18:18 Reassessment: Patient appears in no apparent distress at this time. Patient and/or      ca1 

      family updated on plan of care and expected duration. Pain level reassessed.                

                                                                                                  

Vital Signs:                                                                                      

13:59 Pulse 97; Resp 17; Temp 99.0; Pulse Ox 97% on R/A; Weight 52.16 kg; Pain 0/10;          sg  

14:00  / 71; Pulse 106; Resp 16 S; Pulse Ox 100% on R/A;                                aa5 

14:50  / 59; Pulse 99; Resp 16 S; Pulse Ox 100% on R/A;                                 aa5 

15:58  / 86; Pulse 101; Resp 18; Pulse Ox 99% on R/A;                                   ca1 

16:55  / 79; Pulse 108; Resp 19; Pulse Ox 99% on R/A;                                   ca1 

18:00  / 78; Pulse 98; Resp 19; Pulse Ox 100% on R/A;                                   ca1 

18:50  / 77; Pulse 99; Resp 19; Pulse Ox 100% on R/A;                                   ca1 

                                                                                                  

ED Course:                                                                                        

13:44 Patient arrived in ED.                                                                  sb2 

13:45 DANA COLES is Private Physician.                                                      sb2 

13:48 Arm band placed on.                                                                     sg  

13:50 Filemon Velarde, RN is Primary Nurse.                                                    bp  

13:58 Triage completed.                                                                       sg  

14:45 Gregory Johnson MD is Attending Physician.                                             ps1 

15:02 Initial lab(s) drawn, by me, sent to lab. Inserted saline lock: 20 gauge in right       aa5 

      antecubital area, using aseptic technique. Blood collected.                                 

15:10 Patient has correct armband on for positive identification. Placed in gown. Bed in low  ca1 

      position. Call light in reach. Side rails up X2. Cardiac monitor on. Pulse ox on. NIBP      

      on.                                                                                         

15:39 EKG done, by EKG tech. reviewed by Gregory Johnson MD.                                   dt2 

16:50 Straight cath inserted, using sterile technique, 16 Fr. Specimen obtained. Returned     aa5 

      cloudy urine. Patient tolerated well.                                                       

18:20 First set of blood cultures drawn by me.                                                ca1 

18:29 DANA COLES is Referral Physician.                                                     ps1 

18:35 Second set of blood cultures drawn by me.                                               ca1 

19:10 No provider procedures requiring assistance completed. IV discontinued, intact,         ca1 

      bleeding controlled, No redness/swelling at site. Pressure dressing applied.                

                                                                                                  

Administered Medications:                                                                         

18:00 Drug: NS 0.9% 1000 ml Route: IV; Rate: 1 bolus; Site: right antecubital;                ca1 

18:52 Follow up: Response: No adverse reaction; IV Status: Completed infusion                 ca1 

18:38 Drug: Rocephin - (cefTRIAXone) 1 grams Route: IVPB; Infused Over: 30 mins; Site: right  ca1 

      antecubital;                                                                                

18:53 Follow up: Response: No adverse reaction; Administered IVP per pharmacy protocol.       ca1 

18:53 Follow up: IV Status: Completed infusion                                                ca1 

                                                                                                  

                                                                                                  

Point of Care Testing:                                                                            

      Blood Glucose:                                                                              

14:50 Blood Glucose: 110 mg/dL;                                                               aa5 

      Ranges:                                                                                     

                                                                                                  

Intake:                                                                                           

                                                                                                  

Outcome:                                                                                          

18:30 Discharge ordered by MD.                                                                ps1 

19:10 Discharged to home via wheelchair, with .                                        ca1 

19:10 Condition: stable                                                                           

19:10 Discharge instructions given to . Instructed on discharge instructions, follow       

      up and referral plans. medication usage, Demonstrated understanding of instructions,        

      follow-up care, medications, Prescriptions given X 1.                                       

19:11 Patient left the ED.                                                                    ca1 

                                                                                                  

Signatures:                                                                                       

Lobito Ga, RN                         RN   sg                                                   

Kyung Martinez RN                     RN   [email or web address]                                                  

Filemon Velarde RN                      RN   Gregory Barnard MD MD   ps1                                                  

Miri Kim                               sb2                                                  

Yaneli Ortiz                             dt2                                                  

Zoë Mustafa RN                        RN   ca1                                                  

                                                                                                  

**************************************************************************************************

## 2019-01-15 NOTE — RAD REPORT
EXAM DESCRIPTION:  Nicole Single View1/15/2019 3:17 pm

 

CLINICAL HISTORY:  Breast cancer

 

COMPARISON:  2010

 

FINDINGS:   The lungs appear clear of acute infiltrate. The heart is normal size

 

IMPRESSION:   No acute abnormalities displayed

## 2019-02-04 ENCOUNTER — HOSPITAL ENCOUNTER (EMERGENCY)
Dept: HOSPITAL 97 - ER | Age: 64
Discharge: HOME | End: 2019-02-04
Payer: COMMERCIAL

## 2019-02-04 DIAGNOSIS — Z71.1: Primary | ICD-10-CM

## 2019-02-04 PROCEDURE — 99283 EMERGENCY DEPT VISIT LOW MDM: CPT

## 2019-02-04 NOTE — EDPHYS
Physician Documentation                                                                           

 [facility]                                                                

Name: Kerri Parra                                                                                

Age: 63 yrs                                                                                       

Sex: Female                                                                                       

: 1955                                                                                   

MRN: [id number]                                                                                   

Arrival Date: 2019                                                                          

Time: 14:18                                                                                       

Account#: [id number]                                                                            

Bed 10                                                                                            

Private MD: DANA COLES                                                                          

ED Physician Mckay Michael                                                                         

HPI:                                                                                              

                                                                                             

16:21 This 63 yrs old  Female presents to ER via Wheelchair with complaints of       kb  

      Shoulder Pain - SWELLING.                                                                   

16:21 The patient or guardian complains of swelling. anterior chest wall, near axilla.        kb  

      Context: The problem was sustained at home, resulted from being pulled up on left side,     

      The patient reports no decreased range of motion. The patient reports no obvious            

      deformity. Onset: The symptoms/episode began/occurred this morning. Modifying factors:      

      the symptoms are alleviated by nothing. The symptoms are aggravated by nothing.             

      Associated signs and symptoms: The patient has no apparent associated signs or              

      symptoms. Severity of symptoms: At their worst the symptoms were very mild, in the          

      emergency department the symptoms are unchanged. Treatment prior to arrival includes:       

      no previous treatment. The patient has not experienced similar symptoms in the past.        

      The patient has not recently seen a physician. Spouse reports he normally get pt up by      

      right side to walk her, but yesterday he got her up on the left. States he may have         

      lifted her arm too high because she said it hurt when he did it. This morning noted         

      some swelling to anterior chest, near axilla. Pt denies pain, but he wanted to get it       

      looked at. States it has gotten better since he first noticed it.                           

                                                                                                  

Historical:                                                                                       

- Allergies:                                                                                      

14:27 No Known Allergies;                                                                     sg  

- PMHx:                                                                                           

14:27 Brain bleed; breast cancer; Hypothyroidism; traumatic brain injury;                     sg  

- PSHx:                                                                                           

14:27 Mastectomy, Left;                                                                       sg  

                                                                                                  

- Immunization history:: Adult Immunizations up to date.                                          

- Social history:: Smoking status: unknown.                                                       

- Ebola Screening: : Patient negative for fever greater than or equal to 101.5 degrees            

  Fahrenheit, and additional compatible Ebola Virus Disease symptoms Patient denies               

  exposure to infectious person Patient denies travel to an Ebola-affected area in the            

  21 days before illness onset.                                                                   

                                                                                                  

                                                                                                  

ROS:                                                                                              

16:18 Constitutional: Negative for fever, chills, and weight loss, Cardiovascular: Negative   kb  

      for chest pain, palpitations, and edema, Respiratory: Negative for shortness of breath,     

      cough, wheezing, and pleuritic chest pain, Abdomen/GI: Negative for abdominal pain,         

      nausea, vomiting, diarrhea, and constipation, MS/Extremity: Negative for injury and         

      deformity, Neuro: Negative for headache, weakness, numbness, tingling, and seizure.         

16:18 Skin: Positive for swelling, of the left axilla, Negative for                               

                                                                                                  

Exam:                                                                                             

16:19 Constitutional:  This is a well developed, well nourished patient who is awake, alert,  kb  

      and in no acute distress. Head/Face:  Normocephalic, atraumatic. Neck:  Trachea             

      midline, no thyromegaly or masses palpated, and no cervical lymphadenopathy.  Supple,       

      full range of motion without nuchal rigidity, or vertebral point tenderness.  No            

      Meningismus. Cardiovascular:  Regular rate and rhythm with a normal S1 and S2.  No          

      gallops, murmurs, or rubs.  Normal PMI, no JVD.  No pulse deficits. Respiratory:  Lungs     

      have equal breath sounds bilaterally, clear to auscultation and percussion.  No rales,      

      rhonchi or wheezes noted.  No increased work of breathing, no retractions or nasal          

      flaring. Abdomen/GI:  Soft, non-tender, with normal bowel sounds.  No distension or         

      tympany.  No guarding or rebound.  No evidence of tenderness throughout. MS/ Extremity:     

       Pulses equal, no cyanosis.  Neurovascular intact.  Full, normal range of motion.           

      Neuro:  Awake and alert, GCS 15, oriented to person, place, time, and situation.            

      Cranial nerves II-XII grossly intact.  Motor strength 5/5 in all extremities.  Sensory      

      grossly intact.  Cerebellar exam normal.  Normal gait.                                      

16:19 Chest/axilla: Inspection: slight swelling noted to anterior chest, near axilla. No pain     

      on palpation. , Palpation: is normal, no crepitus, no tenderness, Axilla:                   

      lymphadenopathy, is not appreciated.                                                        

                                                                                                  

Vital Signs:                                                                                      

14:28 BP 98 / 41; Pulse 90; Resp 19; Temp 98.7; Pulse Ox 100% on R/A;                         sg  

16:30  / 55; Pulse 88; Resp 18; Pulse Ox 100% on R/A;                                   rv  

                                                                                                  

MDM:                                                                                              

15:57 Patient medically screened.                                                             kb  

16:19 Data reviewed: vital signs, nurses notes. Data interpreted: Pulse oximetry: on room air kb  

      is 100 %. Interpretation: normal. Counseling: I had a detailed discussion with the          

      patient and/or guardian regarding: the historical points, exam findings, and any            

      diagnostic results supporting the discharge/admit diagnosis, the need for outpatient        

      follow up, a family practitioner, to return to the emergency department if symptoms         

      worsen or persist or if there are any questions or concerns that arise at home.             

                                                                                                  

Administered Medications:                                                                         

No medications were administered                                                                  

                                                                                                  

                                                                                                  

Disposition:                                                                                      

17:04 Co-signature as Attending Physician, Mckay Michael MD.                                    rn  

                                                                                                  

Disposition:                                                                                      

19 16:17 Discharged to Home. Impression: Person with feared health complaint in whom no     

  diagnosis is made.                                                                              

- Condition is Stable.                                                                            

- Discharge Instructions: Musculoskeletal Pain.                                                   

                                                                                                  

- Medication Reconciliation Form, Thank You Letter, Antibiotic Education, Prescription            

  Opioid Use form.                                                                                

- Follow up: Emergency Department; When: As needed; Reason: Worsening of condition.               

  Follow up: Private Physician; When: 2 - 3 days; Reason: Recheck today's complaints,             

  Continuance of care, Re-evaluation by your physician.                                           

                                                                                                  

                                                                                                  

                                                                                                  

Signatures:                                                                                       

Deepa Andrea, DONNA-C                 FNP-Lobito Bender RN RN sg Nieto, Roman, MD MD rn Vicente, Ronaldo, RN                    RN   rv                                                   

                                                                                                  

Corrections: (The following items were deleted from the chart)                                    

16:30 16:17 2019 16:17 Discharged to Home. Impression: Person with feared health        rv  

      complaint in whom no diagnosis is made. Condition is Stable. Forms are Medication           

      Reconciliation Form, Thank You Letter, Antibiotic Education, Prescription Opioid Use.       

      Follow up: Emergency Department; When: As needed; Reason: Worsening of condition.           

      Follow up: Private Physician; When: 2 - 3 days; Reason: Recheck today's complaints,         

      Continuance of care, Re-evaluation by your physician. kb                                    

                                                                                                  

**************************************************************************************************

## 2019-02-04 NOTE — ER
Nurse's Notes                                                                                     

 [facility]                                                                

Name: Kerri Parra                                                                                

Age: 63 yrs                                                                                       

Sex: Female                                                                                       

: 1955                                                                                   

MRN: [id number]                                                                                   

Arrival Date: 2019                                                                          

Time: 14:18                                                                                       

Account#: [id number]                                                                            

Bed 10                                                                                            

Private MD: DANA COLES                                                                          

Diagnosis: Person with feared health complaint in whom no diagnosis is made                       

                                                                                                  

Presentation:                                                                                     

                                                                                             

14:28 Presenting complaint:  states: Left sided arm pit pain and swelling, reports he  sg  

      was helping her stand up and thinks that the left shoulder might now have been placed       

      right when lifting, pt reports pain to the left armpit area. Transition of care:            

      patient was not received from another setting of care. Onset of symptoms was 2019. Risk Assessment: Do you want to hurt yourself or someone else? Patient            

      reports no desire to harm self or others. Initial Sepsis Screen: Does the patient meet      

      any 2 criteria? No. Patient's initial sepsis screen is negative. Does the patient have      

      a suspected source of infection? No. Patient's initial sepsis screen is negative. Care      

      prior to arrival: None.                                                                     

14:28 Method Of Arrival: Wheelchair                                                           sg  

14:28 Acuity: ALEJANDRO 4                                                                           sg  

                                                                                                  

Historical:                                                                                       

- Allergies:                                                                                      

14:27 No Known Allergies;                                                                     sg  

- PMHx:                                                                                           

14:27 Brain bleed; breast cancer; Hypothyroidism; traumatic brain injury;                     sg  

- PSHx:                                                                                           

14:27 Mastectomy, Left;                                                                       sg  

                                                                                                  

- Immunization history:: Adult Immunizations up to date.                                          

- Social history:: Smoking status: unknown.                                                       

- Ebola Screening: : Patient negative for fever greater than or equal to 101.5 degrees            

  Fahrenheit, and additional compatible Ebola Virus Disease symptoms Patient denies               

  exposure to infectious person Patient denies travel to an Ebola-affected area in the            

  21 days before illness onset.                                                                   

                                                                                                  

                                                                                                  

Screenin:02 Abuse screen: Denies threats or abuse. Denies injuries from another. Nutritional        rv  

      screening: No deficits noted. Tuberculosis screening: No symptoms or risk factors           

      identified. Fall Risk None identified.                                                      

                                                                                                  

Assessment:                                                                                       

16:01 General: Appears in no apparent distress. comfortable, Behavior is calm, cooperative.   rv  

      Pain: Complains of pain in LEFT SHOULDER. Neuro: Level of Consciousness is awake,           

      alert, obeys commands, Oriented to person, place, time, situation. Cardiovascular:          

      Capillary refill < 3 seconds. Respiratory: Airway is patent. GI: No signs and/or            

      symptoms were reported involving the gastrointestinal system. : No signs and/or           

      symptoms were reported regarding the genitourinary system. EENT: No signs and/or            

      symptoms were reported regarding the EENT system. Derm: Skin is intact.                     

      Musculoskeletal: No signs and/or symptoms reported regarding the musculoskeletal system.    

                                                                                                  

Vital Signs:                                                                                      

14:28 BP 98 / 41; Pulse 90; Resp 19; Temp 98.7; Pulse Ox 100% on R/A;                         sg  

16:30  / 55; Pulse 88; Resp 18; Pulse Ox 100% on R/A;                                   rv  

                                                                                                  

ED Course:                                                                                        

14:18 Patient arrived in ED.                                                                  sb2 

14:18 DANA COLES is Private Physician.                                                      sb2 

14:27 Arm band placed on.                                                                     sg  

14:29 Triage completed.                                                                       sg  

15:56 [facility]                                                        kb  

15:56 Mckay Michael MD is Attending Physician.                                                kb  

16:02 Patient has correct armband on for positive identification. Bed in low position. Call   rv  

      light in reach. Adult w/ patient. Pulse ox on.                                              

16:29 No provider procedures requiring assistance completed. Patient did not have IV access   rv  

      during this emergency room visit.                                                           

                                                                                                  

Administered Medications:                                                                         

No medications were administered                                                                  

                                                                                                  

                                                                                                  

Outcome:                                                                                          

16:17 Discharge ordered by MD.                                                                kb  

16:30 Discharged to home via wheelchair.                                                      rv  

16:30 Condition: good                                                                             

16:30 Discharge instructions given to family, Instructed on discharge instructions, follow up     

      and referral plans. Demonstrated understanding of instructions, follow-up care.             

16:30 Patient left the ED.                                                                    rv  

                                                                                                  

Signatures:                                                                                       

Deepa Andrea FNP-C FNP-Ckb Gay, Steven RN                         JUNAID                                                      

Miri Kim                               [facility]                                                  

Abraham Carpenter RN                    RN   rv                                                   

                                                                                                  

**************************************************************************************************

## 2020-10-04 NOTE — XMS REPORT
Summary of Care: 18 - 18

 Created on:2127



Patient:SUNI BILL

Sex:Female

:1955

External Reference #:[id number]





Demographics







 Address  [phone]



   [place] [address]

 

 Home Phone  [phone]

 

 Email Address  NONE

 

 Preferred Language  English

 

 Marital Status  

 

 Orthodoxy Affiliation  Mandaen

 

 Race  Other

 

 Additional Race(s)  Unavailable

 

 Ethnic Group  Not  or 









Author







 Organization  [facility]

 

 Address  [address] [place] [id number]-

 

 Phone  [phone]









Encounter

HQ Encntr_lindsay(FIN) [id number] Date(s): 18 - 18

[address] Professional Services provided by        
    [facility]       [place] [address]-  
   [phone]

Discharge Disposition: DC/DISC TO REHAB

Attending Physician: Liz He MD

Admitting Physician: Max Wright MD





Vital Signs







 Most recent to oldest  1  2  3



 [Reference Range]:      

 

 Height  165.1 cm  167.64 cm  



   (18 12:04 AM)  (18 6:17 PM)  

 

 Current Weight  72.1 kg  62.6 kg  67.6 kg



   (18 6:20 AM)  (18 5:20 AM)  (18 4:54 AM)

 

 Temperature Oral [96.4-99.1  98.0 DegF  98.7 DegF  98.6 DegF



 DegF]  (18 3:38 PM)  (18 11:26 AM)  (18 8:26 AM)

 

 Blood Pressure [/60-90  93/49 mmHg  103/55 mmHg  100/63 mmHg



 mmHg]  (18 3:38 PM)  (18 11:26 AM)  (18 8:26 AM)

 

 Respiratory Rate [14-20 BRMIN]  18 BRMIN  18 BRMIN  18 BRMIN



   (18 3:38 PM)  (18 11:26 AM)  (18 8:26 AM)

 

 Peripheral Pulse Rate [  71 bpm  61 bpm  81 bpm



 bpm]  (18 3:38 PM)  (18 11:26 AM)  (18 8:26 AM)

 

 Weight  72.727 kg    



   (18 6:17 PM)    

 

 Body Mass Index  25.88 m2    



   (18 6:17 PM)    







Problem List







 Condition  Effective Dates  Status  Health Status  Informant

 

 ICH (intracerebral    Active    



 hemorrhage)(Confirmed)        

 

 Dysphagia(Confirmed)    Active    

 

 Skull fracture(Confirmed)    Active    

 

 HTN (hypertension)(Confirmed)    Active    







Allergies, Adverse Reactions, Alerts







 Substance  Reaction  Severity  Status

 

 penicillin    Mild  Active







Medications

acetaminophen 160 mg/5 mL oral suspension

PO, Q6H, PRN Pain 1-3/Temp &gt; 100.4 F, 0 Refill(s)

Start Date: 18

Status: SuspendedamLODIPine 10 mg oral tablet

10 mg=1 tab, PO, Daily, 0 Refill(s)

Start Date: 18

Status: Suspendedaspirin 81 mg tablet, enteric coated

81 mg=1 tab, PO, Daily, 0 Refill(s)

Start Date: 18

Status: SuspendedAspirin Enteric Coated

81 mg, 1 tab, Route: PO, Drug form: ECTAB, Daily, Dosing Weight 72.727, kg, 
Start date: 18 9:00:00 CDT, Duration: 30 day, Stop date: 18 9:00:00 
CDT

Notes: Do not crush or chew.(Same As: Ecotrin)

Start Date: 18

Stop Date: 18

Status: DiscontinuedBenadryl

25 mg, 1 cap, Route: PO, Drug form: CAP, Bedtime, Dosing Weight 72.727, kg, PRN 
as needed for insomnia, Start date: 18 21:00:00 CDT, Stop date: 18 
21:00:00 CDT

Notes: (Same as: Benadryl)

Start Date: 18

Stop Date: 18

Status: DiscontinuedBenadryl

25 mg, 1 cap, Route: PO, Drug form: CAP, ONCE, Dosing Weight 72.727, kg, PRN as 
needed for insomnia,Start date: 18 23:30:00 CDT

Notes: (Same as: Benadryl)

Start Date: 18

Stop Date: 18

Status: CompletedBenadryl

25 mg, Route: PO, Bedtime, Dosing Weight 72.727, kg, Start date: 18 21:00:
00 CDT, Duration: 30day, Stop date: 18 21:00:00 CDT

Start Date: 18

Stop Date: 18

Status: CanceledBenadryl

25 mg, 0.5 mL, Route: IVP, Drug form: INJ, ONCE, Dosing Weight 72.727, kg, PRN 
Insomnia, Start date:18 21:28:00 CDT

Notes: (Same as: Benadryl)

Start Date: 18

Stop Date: 18

Status: Completedbisacodyl

10 mg, 1 supp, Route: NJ, Drug form: SUPP, ONCE, Dosing Weight 72.727, kg, PRN 
as needed for constipation, Start date: 18 8:22:00 CDT

Notes: (Same As: Dulcolax, Bisco-Lax)

Start Date: 18

Stop Date: 18

Status: Discontinuedbisacodyl

10 mg, 1 supp, Route: NJ, Drug form: SUPP, Daily, Dosing Weight 72.727, kg, PRN 
Constipation, Start date: 18 21:05:00 CDT, Duration: 30 day, Stop date:  21:04:00 CDT

Notes: (Same As: Dulcolax, Bisco-Lax)

Start Date: 18

Stop Date: 18

Status: Discontinuedbisacodyl 10 mg rectal suppository

10 mg=1 supp, NJ, Daily, PRN Constipation, 0 Refill(s)

Start Date: 18

Status: Suspendedcalcium carbonate 500 mg (200 mg elemental calcium) oral tablet

1,000 mg, 2 tab, Route: PO, Drug form: CHEWTAB, PRN, Dosing Weight 72.727, kg, 
PRN Abnormal Lab Result, FOR ICU USE ONLY, Start date: 18 23:38:00 CDT, 
Duration: 30 day, Stop date: 18 23:37:00 CDT

Notes: (Same As: Tums)Calcium Carbonate 500 qm=165 mg elemental calcium   Dose=_
_____ mg calcium carbonate (______ mg elemental calcium)

Start Date: 18

Stop Date: 18

Status: Discontinuedcalcium carbonate 500 mg (200 mg elemental calcium) oral 
tablet

500 mg, 1 tab, Route: PO, Drug form: CHEWTAB, PRN, Dosing Weight 72.727, kg, 
PRN Abnormal Lab Result, FOR ICU USE ONLY, Start date: 18 23:38:00 CDT, 
Duration: 30 day, Stop date: 18 23:37:00CDT

Notes: (Same As: Tums)Calcium Carbonate 500 df=170 mg elemental calcium   Dose=_
_____ mg calcium carbonate (______ mg elemental calcium)

Start Date: 18

Stop Date: 18

Status: Discontinuedcalcium gluconate + Sodium Chloride 0.9% IV 50 mL

1 gm, 10 mL, Route: IVPB, PRN, Dosing Weight 72.727, kg, PRN Abnormal Lab Result
, Start date: 18 23:38:00 CDT, Duration: 30 day, Stop date: 18 23:37
:00 CDT, FOR ICU USE ONLY

Notes: WASTE: F/P - Sink; E - Municipal Trash Bin

Start Date: 18

Stop Date: 18

Status: DiscontinuedCardene 20 mg in  mL (Titrate.) IV 20 mg

20 mg, 200 mL, Rate: Titrate, Start Dose: 5 mg/hr, Titration: 2.5 mg/hr every 
15 minutes, Goal(s): goal SBP &lt; 150 mmhg, Max Dose: 15 mg/hr, Route: IV, 
Dosing Weight 72.727 kg, Total Volume: 200, Start date: 18 4:38:00 CDT, 
Duration: 30 day, Stop...

Notes: Same as: CardeneConcentration: (0.1 mg/ 1 ml)

Start Date: 18

Stop Date: 18

Status: DiscontinuedCardene 40 mg in  mL (Titrate.) IV 40 mg

40 mg, 200 mL, Rate: Titrate, Start Dose: 5 mg/hr, Titration: 2.5 mg/hr every 
15 minutes, Goal(s): SBP &lt; 140, Max Dose: 15 mg/hr, Route: IV, Dosing Weight 
72.727 kg, Total Volume: 200, Start date: 18 19:38:00 CDT, Duration: 30 
day, Stop date: ..

Notes: Same as: CardeneConcentration: (0.2 mg /1 ml )

Start Date: 18

Stop Date: 18

Status: Discontinuedcarvedilol 25 mg oral tablet

25 mg=1 tab, PO, Q12H, 0 Refill(s)

Start Date: 18

Status: SuspendedCiprodex otic suspension

5 drp, Route: LEFT EAR, Drug Form: SOLN, Dosing Weight 72.727, kg, BID, Start 
date: 18 9:00:00CDT, Duration: 7 day, Stop date: 18 17:00:00 CDT

Notes: (Same As: Ciprodex)

Start Date: 18

Stop Date: 18

Status: CompletedcloNIDine 0.1 mg oral tablet

0.1 mg, 1 tab, Route: PO, Drug form: TAB, Bedtime, Dosing Weight 72.727, kg, 
Start date: 18 21:00:00 CDT, Duration: 7 day, Stop date: 18 21:00:
00 CDT

Notes: (Same As: Catapres)

Start Date: 18

Stop Date: 18

Status: CanceledCoreg

6.25 mg, 1 tab, Route: PO, Drug form: TAB, Q12H, Dosing Weight 72.727, kg, 
Start date: 18 0:00:00 CDT, Duration: 30 day, Stop date: 18 21:00:
00 CDT

Notes: Give with food. (Same As: Coreg)

Start Date: 18

Stop Date: 18

Status: DiscontinuedCoreg

25 mg, 1 tab, Route: PO, Drug form: TAB, Q12H, Dosing Weight 72.727, kg, Start 
date: 18 21:00:00 CDT, Stop date: 18 9:00:00 CDT

Notes: Give with food. (Same As: Coreg)

Start Date: 18

Stop Date: 18

Status: Discontinueddiltiazem

10 mg, 2 mL, Route: IV, Drug form: INJ, ONCE, Dosing Weight 72.727, kg, Start 
date: 18 7:51:00CDT, Stop date: 18 7:51:00 CDT

Notes: (Same as: Cardizem)

Start Date: 18

Stop Date: 18

Status: Completeddiltiazem 125 mg in  mL (Titrate.)  mg + Sodium 
Chloride 0.9%  mL

125 mg, 25 mL, Rate: Titrate, Start Dose: 5 mg/hr, Titration: 5 mg/hr every hour
, Goal(s): Maintain HR &lt; 100, Max Dose: 15 mg/hr, Route: IV, Dosing Weight 
72.727 kg, Total Volume: 125, Start date: 18 7:51:00 CDT, Duration: 30 day
, Stop date: ..

Notes: (Same as: Cardizem)

Start Date: 18

Stop Date: 18

Status: Discontinueddocusate sodium

100 mg, 10 mL, Route: PO, Drug form: LIQ, Q12H, Dosing Weight 72.727, kg, Start 
date: 18 9:00:00 CDT, Stop date: 18 21:00:00 CDT

Notes: (Same as: Colace)

Start Date: 18

Stop Date: 18

Status: Discontinueddocusate sodium 100 mg oral capsule

PO, Q12H, 0 Refill(s)

Start Date: 18

Status: Suspendeddonepezil 10 mg oral tablet

10 mg=1 tab, PO, BID, 0 Refill(s)

Start Date: 18

Stop Date: 18

Status: Discontinuedfamotidine

20 mg, 2 mL, Route: IVP, Drug form: INJ, Q12H, Dosing Weight 72.727, kg, Start 
date: 18 9:00:00 CDT, Duration: 30 day, Stop date: 18 21:00:00 CDT

Notes: (Same as: Pepcid)Can be dilute in 5-10cc NS  IVP: Slow IV push over at 
least 2 minutes.

Start Date: 18

Stop Date: 18

Status: DiscontinuedFleet Enema

133 mL, Route: NJ, Drug Form: WAN, Dosing Weight 72.727, kg, ONCE, PRN as 
needed for constipation, Start date: 18 10:10:00 CDT

Start Date: 18

Stop Date: 18

Status: Completedgabapentin

300 mg, 1 cap, Route: PO, Drug form: CAP, Q8H, Dosing Weight 72.727, kg, (CrCl &
gt; 60 ml/min), Start date: 18 16:00:00 CDT, Duration: 30 day, Stop date: 
18 8:00:00 CDT

Notes: (Same as: Neurontin)

Start Date: 18

Stop Date: 18

Status: Discontinuedgabapentin 250 mg/5 mL oral solution

300 mg, 6 mL, Route: PO, Drug form: SOLN, Q8H, Dosing Weight 72.727, kg, Start 
date: 18 18:00:00 CDT, Duration: 30 day, Stop date: 18 16:00:00 CDT

Notes: (Same as: Neurontin)

Start Date: 18

Stop Date: 18

Status: Discontinuedheparin 5000 units/mL injectable solution

5,000 unit, 1 mL, Route: SUB-Q, Drug form: INJ, Q8H, Dosing Weight 72.727, kg, 
Start date: 18 8:00:00 CDT, Duration: 30 day, Stop date: 18 0:00:00 
CDT

Notes: porcine heparin

Start Date: 18

Stop Date: 18

Status: Discontinuedheparin 5000 units/mL injectable solution

SUB-Q, Q8H, 0 Refill(s)

Start Date: 18

Status: SuspendedhydrALAZINE

10 mg, 0.5 mL, Route: IVP, Drug form: INJ, Q2H, Dosing Weight 72.727, kg, PRN 
Hypertension, Start date: 18 3:24:00 CDT, Duration: 30 day, Stop date:  3:23:00 CDT

Notes: (Same as: Apresoline)Push over 5 minutes

Start Date: 18

Stop Date: 18

Status: DiscontinuedhydrALAZINE

10 mg, 0.5 mL, Route: IVP, Drug form: INJ, Q2H, Dosing Weight 72.727, kg, PRN 
Hypertension, Start date: 18 22:28:00 CDT, Duration: 30 day, Stop date:  22:27:00 CDT

Notes: (Same as: Apresoline)Push over 5 minutes

Start Date: 18

Stop Date: 18

Status: DiscontinuedhydrALAZINE 25 mg oral tablet

25 mg, 1 tab, Route: PO, Drug form: TAB, Q6H, Dosing Weight 72.727, kg, PRN 
Hypertension, Start date: 18 10:11:00 CDT, Duration: 30 day, Stop date:  10:10:00 CDT

Notes: (Same as: Apresoline) May interfere w/enteral feedings Take With Food.

Start Date: 18

Stop Date: 18

Status: DiscontinuedInsulin regular

10 unit, 0.1 mL, Route: SUB-Q, Drug form: SOLN, Sliding Scale, Dosing Weight 
72.727, kg, PRN Blood Glucose Results, Start date: 18 21:05:00 CDT, 
Duration: 30 day, Stop date: 18 21:04:00 CDT

Notes: (Same as: Humulin R) Roll in palms of hands gently;  Do not shake 
vigorously. "single patientuse only"(Restricted to patients requiring a dose &gt
;  60 units)WASTE: F/P - Black; E - Municipal Trash Bin  Stable for 28 days at 
room temperatureExpires in ______ days from ______________Date

Start Date: 18

Stop Date: 18

Status: DiscontinuedInsulin regular

8 unit, 0.08 mL, Route: SUB-Q, Drug form: SOLN, Sliding Scale, Dosing Weight 
72.727, kg, PRN Blood Glucose Results, Start date: 18 21:05:00 CDT, 
Duration: 30 day, Stop date: 18 21:04:00 CDT

Notes: (Same as: Humulin R) Roll in palms of hands gently;  Do not shake 
vigorously. "single patientuse only"(Restricted to patients requiring a dose &gt
;  60 units)WASTE: F/P - Black; E - Municipal Trash Bin  Stable for 28 days at 
room temperatureExpires in ______ days from ______________Date

Start Date: 18

Stop Date: 18

Status: DiscontinuedInsulin regular

4 unit, 0.04 mL, Route: SUB-Q, Drug form: SOLN, Sliding Scale, Dosing Weight 
72.727, kg, PRN Blood Glucose Results, Start date: 18 21:05:00 CDT, 
Duration: 30 day, Stop date: 18 21:04:00 CDT

Notes: (Same as: Humulin R) Roll in palms of hands gently;  Do not shake 
vigorously. "single patientuse only"(Restricted to patients requiring a dose &gt
;  60 units)WASTE: F/P - Black; E - Municipal Trash Bin  Stable for 28 days at 
room temperatureExpires in ______ days from ______________Date

Start Date: 18

Stop Date: 18

Status: DiscontinuedInsulin regular

6 unit, 0.06 mL, Route: SUB-Q, Drug form: SOLN, Sliding Scale, Dosing Weight 
72.727, kg, PRN Blood Glucose Results, Start date: 18 21:05:00 CDT, 
Duration: 30 day, Stop date: 18 21:04:00 CDT

Notes: (Same as: Humulin R) Roll in palms of hands gently;  Do not shake 
vigorously. "single patientuse only"(Restricted to patients requiring a dose &gt
;  60 units)WASTE: F/P - Black; E - Municipal Trash Bin  Stable for 28 days at 
room temperatureExpires in ______ days from ______________Date

Start Date: 18

Stop Date: 18

Status: DiscontinuedInsulin regular

2 unit, 0.02 mL, Route: SUB-Q, Drug form: SOLN, Sliding Scale, Dosing Weight 
72.727, kg, PRN Blood Glucose Results, Start date: 18 21:05:00 CDT, 
Duration: 30 day, Stop date: 18 21:04:00 CDT

Notes: (Same as: Humulin R) Roll in palms of hands gently;  Do not shake 
vigorously. "single patientuse only"(Restricted to patients requiring a dose &gt
;  60 units)WASTE: F/P - Black; E - Municipal Trash Bin  Stable for 28 days at 
room temperatureExpires in ______ days from ______________Date

Start Date: 18

Stop Date: 18

Status: DiscontinuedKeppra + Sodium Chloride 0.9%  mL

1,000 mg, Route: IV, ONCE, Dosing Weight 72.727, kg, Priority: STAT, Start date
: 18 19:20:00 CDT, Stop date: 18 19:20:00 CDT

Notes: Same as KeppraMix with 100 mL NS, LR or D5W  *** MEDICATION WASTE ***
Product Size:  500 mgProduct Wasted:  ___ mg

Start Date: 18

Stop Date: 18

Status: CompletedKeppra + Sodium Chloride 0.9%  mL

1,500 mg, Route: IVPB, ONCE, Dosing Weight 72.727, kg, Loading Dose, Start date
: 18 4:16:00 CDT, Stop date: 18 4:16:00 CDT

Notes: Same as KeppraMix with 100 mL NS, LR or D5W  *** MEDICATION WASTE ***
Product Size:  500 mgProduct Wasted:  _0__ mg

Start Date: 18

Stop Date: 18

Status: CompletedKeppra 500 mg oral tablet

500 mg, 1 tab, Route: PO, Drug form: TAB, Q12H, Dosing Weight 72.727, kg, Start 
date: 18 9:00:00 CDT, Stop date: 18 21:00:00 CDT

Notes: (Same as:Keppra)

Start Date: 18

Stop Date: 18

Status: DiscontinuedKeppra 500 mg oral tablet

500 mg, 1 tab, Route: PO, Drug form: TAB, Q12H, Dosing Weight 72.727, kg, Start 
date: 18 21:00:00 CDT, Duration: 30 day, Stop date: 18 9:00:00 CDT

Notes: (Same as:Keppra)

Start Date: 18

Stop Date: 18

Status: Discontinuedlabetalol

20 mg, 4 mL, Route: IVP, Drug form: INJ, Q1H, Dosing Weight 72.727, kg, PRN 
Hypertension, Start date: 18 21:38:00 CDT, Stop date: 18 21:37:00 
CDT

Start Date: 18

Stop Date: 7/3/18

Status: Discontinuedlabetalol

10 mg, 2 mL, Route: IVP, Drug form: INJ, ONCE, Dosing Weight 72.727, kg, PRN 
Hypertension, Start date: 18 0:24:00 CDT

Start Date: 18

Stop Date: 18

Status: Completedlabetalol

10 mg, 2 mL, Route: IVP, Drug form: INJ, Q15Min, Dosing Weight 72.727, kg, PRN 
Hypertension, Start date: 18 21:05:00 CDT, Duration: 3 doses or times, 
Stop date: Limited # of times

Start Date: 18

Stop Date: 18

Status: CompletedlevETIRAcetam + Sodium Chloride 0.9%  mL

500 mg, Route: IVPB, Q12H, Dosing Weight 72.727, kg, Priority: Routine, Start 
date: 18 9:00:00CDT, Duration: 30 day, Stop date: 18 21:00:00 CDT

Notes: Same as KeppraMix with 100 mL NS, LR or D5W  *** MEDICATION WASTE ***
Product Size:  500 mgProduct Wasted:  ___ mg

Start Date: 18

Stop Date: 18

Status: Discontinuedlevothyroxine

88 microgram, 1 tab, Route: PO, Drug form: TAB, Q630AM, Dosing Weight 72.727, kg
, Start date: 18 6:30:00 CDT, Duration: 30 day, Stop date: 18 6:30:
00 CDT

Notes: Take 1 hour before or 2 hours after meal; Enteral feeds may interefere 
with the absorption ofthis medication. (Same as:Synthroid)

Start Date: 18

Stop Date: 18

Status: Discontinuedlevothyroxine 88 mcg (0.088 mg) oral tablet

88 microgram=1 tab, PO, Daily, 0 Refill(s)

Start Date: 18

Stop Date: 18

Status: Discontinuedlevothyroxine 88 mcg (0.088 mg) oral tablet

88 microgram=1 tab, PO, Q630AM, 0 Refill(s)

Start Date: 18

Status: Suspendedmagnesium citrate 1.745 g/30 mL oral liquid

150 ml, Route: PO, Drug Form: LIQ, Dosing Weight 72.727, kg, ONCE, PRN 
Constipation, Start date: 18 7:52:00 CDT

Notes: (Same as: Citrate of Magnesia)Concentration: 1.745 gm / 30 mL

Start Date: 18

Stop Date: 18

Status: Completedmagnesium oxide

800 mg, 2 tab, Route: PO, Drug form: TAB, PRN, Dosing Weight 72.727, kg, PRN 
Abnormal Lab Result, FOR ICU USE ONLY, Start date: 18 23:38:00 CDT, 
Duration: 30 day, Stop date: 18 23:37:00 CDT

Notes: (Same as: Mag-Ox 400)Magnesium oxide [id number] elemental magnesiumDose=
____mg magnesium oxide (___mg elemental magnesium)

Start Date: 18

Stop Date: 18

Status: Discontinuedmagnesium sulfate

2 gm, 50 mL, Route: IVPB, Drug form: INJ, PRN, Dosing Weight 72.727, kg, PRN 
Abnormal Lab Result, Start date: 18 23:38:00 CDT, Duration: 30 day, Stop 
date: 18 23:37:00 CDT, FOR ICU USE ONLY

Notes: WASTE: F/P - Sink; E - Municipal Trash Bin

Start Date: 18

Stop Date: 18

Status: Discontinuedmannitol

25 gm, 125 mL, Route: IVPB, Drug form: INJ, Q8H-01, Dosing Weight 72.727, kg, 
Priority: NOW, Start date: 18 17:09:00 CDT, Duration: 30 day, Stop date: 
18 17:00:00 CDT

Notes: (Same as: Osmitrol)Infuse through 5 micron or smaller filter  WASTE: F/P 
- Sink; E - Municipal Trash Bin

Start Date: 18

Stop Date: 18

Status: Discontinuedmannitol

35 gm, 175 mL, Route: IVPB, Drug form: INJ, Q6H, Dosing Weight 72.727, kg, 
Start date: 18 12:00:00 CDT, Duration: 30 day, Stop date: 18 6:00:
00 CDT

Notes: (Same as: Osmitrol)Infuse through 5 micron or smaller filter  WASTE: F/P 
- Sink; E - Municipal Trash Bin

Start Date: 18

Stop Date: 18

Status: Discontinuedmannitol

25 gm, 125 mL, Route: IVPB, Drug form: INJ, Q6H, Dosing Weight 72.727, kg, PRN 
Other -See Comment, Start date: 18 14:50:00 CDT, Duration: 30 day, Stop 
date: 18 14:49:00 CDT

Notes: (Same as: Osmitrol)Infuse through 5 micron or smaller filter  WASTE: F/P 
- Sink; E - Municipal Trash Bin

Start Date: 18

Stop Date: 18

Status: Discontinuedmannitol

50 gm, 250 mL, Route: IVPB, Drug form: INJ, ONCE, Dosing Weight 72.727, kg, 
Start date: 18 10:21:00 CDT, Stop date: 18 10:21:00 CDT

Notes: (Same as: Osmitrol)Infuse through 5 micron or smaller filter  WASTE: F/P 
- Sink; E - Municipal Trash Bin

Start Date: 18

Stop Date: 18

Status: Completedmannitol 20% intravenous solution

35 gm, 175 mL, Route: IV, Drug form: INJ, Q8H-05, Dosing Weight 72.727, kg, 
Start date: 18 20:00:00 CDT, Duration: 30 day, Stop date: 18 12:00:
00 CDT

Notes: (Same as: Osmitrol)Infuse through 5 micron or smaller filter  WASTE: F/P 
- Sink; E - Municipal Trash Bin

Start Date: 18

Stop Date: 7/3/18

Status: Discontinuedmannitol 20% intravenous solution

35 gm, 175 mL, Route: IV, Drug form: INJ, Q12H, Dosing Weight 72.727, kg, Start 
date: 18 17:00:00 CDT, Duration: 30 day, Stop date: 18 7:00:00 CDT

Notes: (Same as: Osmitrol)Infuse through 5 micron or smaller filter  WASTE: F/P 
- Sink; E - Municipal Trash Bin

Start Date: 7/3/18

Stop Date: 18

Status: Discontinuedmannitol 20% intravenous solution

35 gm, 175 mL, Route: IV, Drug form: INJ, Q6H, Dosing Weight 72.727, kg, Start 
date: 18 12:00:00 CDT, Duration: 30 day, Stop date: 18 6:00:00 CDT

Notes: (Same as: Osmitrol)Infuse through 5 micron or smaller filter  WASTE: F/P 
- Sink; E - Municipal Trash Bin

Start Date: 18

Stop Date: 18

Status: Discontinuedmannitol 20% intravenous solution

35 gm, 175 mL, Route: IV, Drug form: INJ, ABXQ8H, Dosing Weight 72.727, kg, 
Start date: 18 14:00:00 CDT, Duration: 30 day, Stop date: 18 6:00:
00 CDT

Notes: (Same as: Osmitrol)Infuse through 5 micron or smaller filter  WASTE: F/P 
- Sink; E - Municipal Trash Bin

Start Date: 18

Stop Date: 18

Status: Canceledmelatonin 3 mg oral tablet

3 mg=1 tab, PO, Bedtime, 0 Refill(s)

Start Date: 18

Status: Suspendedmelatonin 3 mg oral tablet

3 mg, 1 tab, Route: PO, Drug Form: TAB, Dosing Weight 72.727, kg, Bedtime, 
Start date: 18 21:00:00 CDT, Duration: 30 day, Stop date: 18 21:00:
00 CDT

Notes: (Same as: Melatonin)

Start Date: 18

Stop Date: 18

Status: Discontinuedmetoprolol 5 mg/5 ml INJ

5 mg, 5 mL, Route: IVP, Drug form: INJ, Q30Min, Dosing Weight 72.727, kg, PRN 
Tachycardia, Start date: 18 13:09:00 CDT, Duration: 30 day, Stop date:  13:08:00 CDT

Notes: (Same as: Lopressor)Push over 2 minutes

Start Date: 7/3/18

Stop Date: 18

Status: Discontinuedmetoprolol 5 mg/5 ml INJ

5 mg, 5 mL, Route: IVP, Drug form: INJ, Q5Min, Dosing Weight 72.727, kg, PRN 
Other -See Comment, Start date: 18 7:16:00 CDT, Duration: 2 doses or times
, Stop date: Limited # of times

Notes: (Same as: Lopressor)Push over 2 minutes

Start Date: 18

Stop Date: 7/3/18

Status: CompletedMiraLax

17 gm, 1 pkt, Route: PO, Drug form: PWDR, BID, Dosing Weight 72.727, kg, Start 
date: 18 9:00:00 CDT, Duration: 30 day, Stop date: 18 17:00:00 CDT

Notes: Dissolve in 8 oz of water or juice.(Same as: Miralax)

Start Date: 18

Stop Date: 18

Status: DiscontinuedMiraLax

17 gm, 1 pkt, Route: PO, Drug form: PWDR, Daily, Dosing Weight 72.727, kg, 
Start date: 18 9:00:00 CDT, Duration: 30 day, Stop date: 18 9:00:00 
CDT

Notes: Dissolve in 8 oz of water or juice.(Same as: Miralax)

Start Date: 18

Stop Date: 18

Status: Discontinuedmorphine Sulfate

2 mg, 0.5 mL, Route: IVP, Drug form: SOLN, Q2H, Dosing Weight 72.727, kg, PRN 
Pain Score 7-10, Startdate: 18 21:05:00 CDT, Duration: 30 day, Stop date: 
18 21:04:00 CDT

Notes: (Same as:MORPhine Sulfate)

Start Date: 18

Stop Date: 18

Status: DiscontinuedNorco 10/325 oral tablet

1 tab, Route: PO, Drug Form: TAB, Dosing Weight 72.727, kg, Q6H, PRN Pain Score 
4-6, STAT, Start date: 18 21:02:00 CDT, Duration: 30 day, Stop date:  21:01:00 CDT

Notes: Do not exceed 4gm/day of acetaminophen.  (Same as: Norco 325/10)

Start Date: 18

Stop Date: 18

Status: Discontinuednormal saline 0.9% IV 1,000 mL

1,000 mL, Rate: 75 ml/hr, Infuse over: 13.3 hr, Route: IV, Dosing Weight 72.727 
kg, Total Volume: 1,000, Start date: 18 21:06:00 CDT, Duration: 30 day, 
Stop date: 18 21:05:00 CDT, 1.86, m2

Start Date: 18

Stop Date: 18

Status: Discontinuednormal saline 0.9% IV 1,000 mL

1,000 mL, Rate: 50 ml/hr, Infuse over: 20 hr, Route: IV, Dosing Weight 72.727 kg
, Total Volume: 1,000, Start date: 18 9:44:00 CDT, Duration: 30 day, Stop 
date: 18 9:43:00 CDT, 1.84, m2

Start Date: 18

Stop Date: 18

Status: DiscontinuedNorvasc

10 mg, 1 tab, Route: PO, Drug form: TAB, Daily, Dosing Weight 72.727, kg, 
Priority: STAT, Start date: 18 23:26:00 CDT, Duration: 30 day, Stop date: 
18 9:00:00 CDT

Notes: (Same as: Norvasc)

Start Date: 18

Stop Date: 18

Status: DiscontinuedNS (Bolus) IV

500 mL, 500 ml/hr, Infuse Over: 1 hr, Route: IV, 500, Drug form: INJ, ONCE, 
Priority: STAT, Dosing Weight 72.727 kg, Start date: 18 19:50:00 CDT, 
Stop date: 18 19:50:00 CDT

Start Date: 18

Stop Date: 18

Status: CompletedNS (Bolus) IV

250 mL, 250 ml/hr, Infuse Over: 1 hr, Route: IV, 250, Drug form: INJ, ONCE, 
Priority: STAT, Dosing Weight 72.727 kg, Start date: 18 7:19:00 CDT, Stop 
date: 18 7:19:00 CDT

Start Date: 18

Stop Date: 18

Status: Completedondansetron

4 mg, 2 mL, Route: IVP, Drug form: INJ, Q8H, Dosing Weight 72.727, kg, PRN 
Nausea &amp; Vomiting, Start date: 18 21:05:00 CDT, Duration: 30 day, 
Stop date: 18 21:04:00 CDT

Notes: (Same as: Zofran)  *** MEDICATION WASTE ***Product Size:  4 mgProduct 
Wasted:  ___ mg

Start Date: 18

Stop Date: 18

Status: Discontinuedpolyethylene glycol 3350 oral powder for reconstitution

PO, Daily, 0 Refill(s)

Start Date: 18

Status: Suspendedpotassium chloride

10 mEq, 50 mL, Route: IVPB, Drug form: INJ, PRN, Dosing Weight 72.727, kg, PRN 
Abnormal Lab Result, Via peripheral line, Start date: 18 23:38:00 CDT, 
Duration: 30 day, Stop date: 18 23:37:00 CDT, FOR ICU USE ONLY

Notes: (Same as: KCL)  Infuse over 2 hours.

Start Date: 18

Stop Date: 18

Status: Discontinuedpotassium chloride

20 mEq, 1 tab, Route: PO, Drug form: ERTAB, PRN, Dosing Weight 72.727, kg, PRN 
Abnormal Lab Result, Start date: 18 23:38:00 CDT, Duration: 30 day, Stop 
date: 18 23:37:00 CDT, FOR ICU USE ONLY

Notes: (Same as: K-Dur 20)"Do Not Crush"For patients unable to swallow tablet, 
dissolve in one half glass of water. Allow about 2 minutes for the tablets to 
disintegrate. Stir before giving to prepare slurry and administer.Please 
exclude Patients with feeding tube less than 14 French (Dobhoff, J-tube etc) 
and pediatric and  patients.  With food and full glass of water

Start Date: 18

Stop Date: 18

Status: Discontinuedpotassium chloride

20 mEq, 100 mL, Route: IVPB, Drug form: INJ, PRN, Dosing Weight 72.727, kg, PRN 
Abnormal Lab Result,Via central line, Start date: 18 23:38:00 CDT, 
Duration: 30 day, Stop date: 18 23:37:00 CDT, FOR ICU USE ONLY

Notes: (Same as: KCL)  Infuse no faster than 10 mEq/hr if given peripherally.

Start Date: 18

Stop Date: 18

Status: Discontinuedpotassium chloride

20 mEq, 15 mL, Route: NJ, Drug form: LIQ, PRN, Dosing Weight 72.727, kg, PRN 
Abnormal Lab Result, Start date: 18 23:38:00 CDT, Duration: 30 day, Stop 
date: 18 23:37:00 CDT, FOR ICU USE ONLY

Notes: (Same as: Potassium Chloride)

Start Date: 18

Stop Date: 18

Status: Discontinuedpotassium phosphate + Sodium Chloride 0.9%  mL

30 mmol, 10 mL, Route: IVPB, PRN, Dosing Weight 72.727, kg, PRN Abnormal Lab 
Result, Start date: 18 23:38:00 CDT, Duration: 30 day, Stop date:  23:37:00 CDT, FOR ICU USE ONLY

Notes: (Same as: K Phosphate.)  1 mMol phoshate has 1.47 mEq potassium  Infuse 
over 4 hours

Start Date: 18

Stop Date: 18

Status: Discontinuedpotassium phosphate + Sodium Chloride 0.9%  mL

15 mmol, 5 mL, Route: IVPB, PRN, Dosing Weight 72.727, kg, PRN Abnormal Lab 
Result, Start date: 18 23:38:00 CDT, Duration: 30 day, Stop date:  23:37:00 CDT, FOR ICU USE ONLY

Notes: (Same as: K Phosphate.)  1 mMol phoshate has 1.47 mEq potassium  Infuse 
over 4 hours

Start Date: 18

Stop Date: 18

Status: Discontinuedpotassium phosphate + Sodium Chloride 0.9%  mL

45 mmol, 15 mL, Route: IVPB, PRN, Dosing Weight 72.727, kg, PRN Abnormal Lab 
Result, Start date: 18 23:38:00 CDT, Duration: 30 day, Stop date:  23:37:00 CDT, FOR ICU USE ONLY

Notes: (Same as: K Phosphate.)  1 mMol phoshate has 1.47 mEq potassium  Infuse 
over 4 hours

Start Date: 18

Stop Date: 18

Status: Discontinuedpotassium phosphate-sodium phosphate 250 mg-280 mg-160 mg 
oral powder for reconstitution

2 pkt, Route: PO, Drug Form: PDR/REC, Dosing Weight 72.727, kg, PRN, PRN 
Abnormal Lab Result, FOR ICU USE ONLY, Start date: 18 23:38:00 CDT, 
Duration: 30 day, Stop date: 18 23:37:00 CDT

Notes: (Same as: Phos-NaK)  Each 1.5 gm pkt has 250mg phosphorous. Mix w/2.5oz 
water and stir.

Start Date: 18

Stop Date: 18

Status: DiscontinuedrOPINIRole

1 mg, 2 tab, Route: PO, Drug form: TAB, Bedtime, Dosing Weight 72.727, kg, 
Start date: 18 21:00:00 CDT, Duration: 30 day, Stop date: 18 21:00:
00 CDT

Notes: (Same as: Requip)

Start Date: 18

Stop Date: 18

Status: DiscontinuedrOPINIRole

0.25 mg, 1 tab, Route: NG, Drug form: TAB, ONCE, Dosing Weight 72.727, kg, 
Start date: 18 7:41:00 CDT, Stop date: 18 7:41:00 CDT

Notes: (Same as: Requip)

Start Date: 18

Stop Date: 18

Status: CompletedrOPINIRole 0.5 mg oral tablet

1 mg=2 tab, PO, Bedtime, 0 Refill(s)

Start Date: 18

Status: SuspendedSaline Flush 0.9%

10 mL, Route: IVP, Drug Form: INJ, Dosing Weight 72.727, kg, PRN, PRN Line Flush
, Start date: 18 18:33:00 CDT, Duration: 30 day, Stop date: 18 18:32
:00 CDT

Notes: (Same as: BD Posiflush)

Start Date: 18

Stop Date: 18

Status: DiscontinuedSaline Flush 0.9%

10 ml, Route: IVP, Drug Form: INJ, Dosing Weight 72.727, kg, PRN, PRN Line Flush
, Start date: 18 21:05:00 CDT, Duration: 30 day, Stop date: 18 21:04
:00 CDT

Notes: (Same as: BD Posiflush)

Start Date: 18

Stop Date: 18

Status: DiscontinuedSaline Flush 0.9%

10 ml, Route: IVP, Drug Form: INJ, Dosing Weight 72.727, kg, Q12H, Start date: 
18 9:00:00 CDT,Duration: 30 day, Stop date: 18 21:00:00 CDT

Notes: (Same as: BD Posiflush)

Start Date: 18

Stop Date: 18

Status: Discontinuedsenna

8.6 mg, 1 tab, Route: PO, Drug Form: TAB, Dosing Weight 72.727, kg, Q12H, Start 
date: 18 9:00:00 CDT, Duration: 30 day, Stop date: 18 21:00:00 CDT

Notes: (Same as: Senokot)

Start Date: 18

Stop Date: 18

Status: Discontinuedsenna 8.6 mg oral tablet

8.6 mg=1 tab, PO, Q12H, 0 Refill(s)

Start Date: 18

Status: Suspendedsodium phosphate + Sodium Chloride 0.9%  mL

30 mmol, 10 mL, Route: IVPB, PRN, Dosing Weight 72.727, kg, PRN Abnormal Lab 
Result, Start date: 18 23:38:00 CDT, Duration: 30 day, Stop date:  23:37:00 CDT, FOR ICU USE ONLY

Start Date: 18

Stop Date: 18

Status: Discontinuedsodium phosphate + Sodium Chloride 0.9%  mL

15 mmol, 5 mL, Route: IVPB, PRN, Dosing Weight 72.727, kg, PRN Abnormal Lab 
Result, Start date: 18 23:38:00 CDT, Duration: 30 day, Stop date:  23:37:00 CDT, FOR ICU USE ONLY

Start Date: 18

Stop Date: 18

Status: Discontinuedsodium phosphate + Sodium Chloride 0.9%  mL

45 mmol, 15 mL, Route: IVPB, PRN, Dosing Weight 72.727, kg, PRN Abnormal Lab 
Result, Start date: 18 23:38:00 CDT, Duration: 30 day, Stop date:  23:37:00 CDT, FOR ICU USE ONLY

Start Date: 18

Stop Date: 18

Status: Discontinuedsterile water INJ 1000 ml 457.25 mL + sodium chloride 23.4% 
 mEq

457.25 mL, Rate: 999 ml/hr, Infuse over: 0.5 hr, Route: IV, Dosing Weight 
72.727 kg, Total Volume: 500, Start date: 18 3:50:00 CDT, Duration: 1 
doses or times, Stop date: 18 4:19:00 CDT, For IMU and ICU use only.  See 
Order Comments!!, 1.8...

Start Date: 18

Stop Date: 18

Status: Deletedsterile water INJ 1000 ml 457.25 mL + sodium chloride 23.4% IV 
171 mEq

457.25 mL, Rate: 999 ml/hr, Infuse over: 0.5 hr, Route: IV, Dosing Weight 
72.727 kg, Total Volume: 500, Start date: 18 6:02:00 CDT, Duration: 1 
doses or times, Stop date: 18 6:31:00 CDT, For IMU and ICU use only.  See 
Order Comments!!, 1.8...

Start Date: 18

Stop Date: 18

Status: Completedsterile water INJ 1000 ml 914.5 mL + sodium chloride 23.4% IV 
342 mEq

914.5 mL, Rate: 75 ml/hr, Infuse over: 13.3 hr, Route: IV, Dosing Weight 72.727 
kg, Total Volume: 1,000, Start date: 18 3:50:00 CDT, Duration: 30 day, 
Stop date: 18 3:49:00 CDT, For IMU andICU use only.  See Order Comments!!
, 1.84, m2

Start Date: 18

Stop Date: 18

Status: DiscontinuedTrintellix 10 mg oral tablet

10 mg=1 tab, PO, Daily, 0 Refill(s)

Start Date: 18

Stop Date: 18

Status: DiscontinuedTylenol

650 mg, 20.3 mL, Route: PO, Drug form: LIQ, Q6H, Dosing Weight 72.727, kg, PRN 
Pain 1-3/Temp &gt; 100.4 F, Priority: STAT, Start date: 18 21:02:00 CDT, 
Stop date: 18 21:01:00 CDT

Notes: Max acetaminophen=4000mg/day (4 gm/day).  (Same as: Tylenol)

Start Date: 18

Stop Date: 18

Status: DiscontinuedVasotec

0.625 mg, 0.5 mL, Route: IVP, Drug form: INJ, Q6H, Dosing Weight 72.727, kg, 
PRN Hypertension, Startdate: 18 23:37:00 CDT, Duration: 30 day, Stop date
: 18 23:36:00 CDT

Notes: (Same as: Vasotec-IV)

Start Date: 18

Stop Date: 18

Status: DiscontinuedVersed

2 mg, 2 mL, Route: IVP, Drug form: INJ, ONCE, Dosing Weight 72.727, kg, Start 
date: 18 7:59:00CDT, Stop date: 18 7:59:00 CDT

Notes: (Same as: Versed)  *** MEDICATION WASTE ***Product Size:  2 mgProduct 
Wasted:  ___ mg

Start Date: 18

Stop Date: 18

Status: CompletedVersed

2 mg, 2 mL, Route: IVP, Drug form: INJ, ONCE, Dosing Weight 72.727, kg, Start 
date: 18 6:33:00CDT, Stop date: 18 6:33:00 CDT

Notes: (Same as: Versed)  *** MEDICATION WASTE ***Product Size:  5 mgProduct 
Wasted:  ___ mg

Start Date: 18

Stop Date: 18

Status: DeletedVersed

2 mg, 2 mL, Route: IVP, Drug form: INJ, ONCE, Dosing Weight 72.727, kg, Start 
date: 18 7:58:00CDT, Stop date: 18 7:58:00 CDT

Notes: (Same as: Versed)  *** MEDICATION WASTE ***Product Size:  2 mgProduct 
Wasted:  ___ mg

Start Date: 18

Stop Date: 18

Status: CompletedVersed

2 mg, 2 mL, Route: IVP, Drug form: INJ, ONCE, Dosing Weight 72.727, kg, Start 
date: 18 6:33:00CDT, Stop date: 18 6:33:00 CDT

Notes: (Same as: Versed)  *** MEDICATION WASTE ***Product Size:  5 mgProduct 
Wasted:  ___ mg

Start Date: 18

Stop Date: 18

Status: DeletedVisipaque 320mg/ml

60 mL, Route: IVP, Drug Form: SOLN, Dosing Weight 72.727, kg, ONCALL, STAT, 
Start date: 18 19:24:00 CDT, Duration: 1 doses or times, Dose=2.2ml/kg,  
Max dose=100ml -- "To be infused by Radiology Staff ONLY"

Start Date: 18

Stop Date: 18

Status: CompletedVisipaque 320mg/ml

100 mL, Route: IVP, Drug Form: SOLN, Dosing Weight 72.727, kg, ONCALL, STAT, 
Start date: 18 19:09:00 CDT, Duration: 1 doses or times, Dose=2.2ml/kg,  
Max dose=100ml -- "To be infused by RadiologyStaff ONLY"

Start Date: 18

Stop Date: 18

Status: Completed



Results

BLOOD BANK RESULTS





 Most recent to oldest [Reference Range]:  1  2  3  4

 

 ABO/Rh  O POS      



   *Unknown*      



   (18 6:35 PM)      

 

 Antibody Scrn  Negative      



   (18 6:35 PM)      

 

 Platelet product  Product available      



   (18 7:38 PM)      



ELECTROLYTES





 Most recent to oldest  1  2  3  4



 [Reference Range]:        

 

 Sodium Lvl [135-145 mEq/L]  141 mEq/L  140 mEq/L  141 mEq/L  



   (18 5:10 AM)  (18 1:26 AM)  (7/3/18 5:31 PM)  

 

 Potassium Lvl [3.5-5.1 mEq/L]  3.6 mEq/L  3.6 mEq/L  3.7 mEq/L  



   (18 5:10 AM)  (18 1:26 AM)  (7/3/18 5:31 PM)  

 

 Chloride Lvl [ mEq/L]  105 mEq/L  104 mEq/L  105 mEq/L  



   (18 5:10 AM)  (18 1:26 AM)  (7/3/18 5:31 PM)  

 

 CO2 [24-32 mEq/L]  26 mEq/L  27 mEq/L  28 mEq/L  



   (18 5:10 AM)  (18 1:26 AM)  (7/3/18 5:31 PM)  

 

 AGAP [10.0-20.0 mEq/L]  13.6 mEq/L  12.6 mEq/L  11.7 mEq/L  



   (18 5:10 AM)  (18 1:26 AM)  (7/3/18 5:31 PM)  



CHEM PANEL





 Most recent to oldest  1  2  3  4



 [Reference Range]:        

 

 Creatinine Lvl  0.61 mg/dL  0.56 mg/dL  0.57 mg/dL  



 [0.50-1.40 mg/dL]  (18 5:10 AM)  (18 1:26 AM)  (7/3/18 5:31 PM)  

 

 eGFR  97 mL/min/1.73m2 1  100 mL/min/1.73m2 2  100 mL/min/1.73m2 3  



   *NA*  *NA*  *NA*  



   (18 5:10 AM)  (18 1:26 AM)  (7/3/18 5:31 PM)  

 

 BUN [7-22 mg/dL]  16 mg/dL  24 mg/dL  19 mg/dL  



   (18 5:10 AM)  *HI*  (7/3/18 5:31 PM)  



     (18 1:26 AM)    

 

 B/C Ratio [6-25]  16      



   (18 12:19 AM)      

 

 Glucose Lvl [70-99  99 mg/dL  98 mg/dL  112 mg/dL  



 mg/dL]  (18 5:10 AM)  (18 1:26 AM)  *HI*  



       (7/3/18 5:31 PM)  

 

 Total Protein  6.3 g/dL  4.6 g/dL  6.5 g/dL  6.6 g/dL



 [6.4-8.4 g/dL]  *LOW*  *LOW*  (18 12:19 AM)  (18 12:19 AM)



   (18 1:26 AM)  (18 12:19 PM)    

 

 Albumin Lvl [3.5-5.0  2.4 g/dL  2.0 g/dL  3.3 g/dL  3.3 g/dL



 g/dL]  *LOW*  *LOW*  *LOW*  *LOW*



   (18 1:26 AM)  (18 12:19 PM)  (18 12:19 AM)  (18 12:19 AM)

 

 Globulin [2.7-4.2  3.9 g/dL  2.6 g/dL  3.2 g/dL  3.3 g/dL



 g/dL]  (18 1:26 AM)  *LOW*  (18 12:19 AM)  (18 12:19 AM)



     (18 12:19 PM)    

 

 A/G Ratio [0.7-1.6]  0.6  0.8  1.0  1.0



   *LOW*  (18 12:19 PM)  (18 12:19 AM)  (18 12:19 AM)



   (18 1:26 AM)      

 

 Calcium Lvl [8.5-10.5  9.0 mg/dL  9.0 mg/dL  8.8 mg/dL  



 mg/dL]  (18 5:10 AM)  (18 1:26 AM)  (7/3/18 5:31 PM)  

 

 Phosphorus [2.5-4.5  3.2 mg/dL  3.0 mg/dL  2.6 mg/dL  



 mg/dL]  (18 1:26 AM)  (7/3/18 2:41 AM)  (18 2:58 AM)  

 

 Magnesium Lvl  2.1 mg/dL  1.9 mg/dL  2.1 mg/dL  



 [1.8-2.4 mg/dL]  (18 1:26 AM)  (7/3/18 2:41 AM)  (18 2:58 AM)  

 

 ALT [0-65 unit/L]  94 unit/L  25 unit/L  29 unit/L  27 unit/L



   *HI*  (18 12:19 PM)  (18 12:19 AM)  (18 12:19 AM)



   (18 1:26 AM)      

 

 AST [0-37 unit/L]  50 unit/L  21 unit/L  43 unit/L  47 unit/L



   *HI*  (18 12:19 PM)  *HI*  *HI*



   (18 1:26 AM)    (18 12:19 AM)  (18 12:19 AM)

 

 Alk Phos [  67 unit/L  39 unit/L  49 unit/L  50 unit/L



 unit/L]  (18 1:26 AM)  (18 12:19 PM)  (18 12:19 AM)  (18 12:
19 AM)

 

 Bili Total [0.2-1.3  0.5 mg/dL  0.4 mg/dL  1.0 mg/dL  0.9 mg/dL



 mg/dL]  (18 1:26 AM)  (18 12:19 PM)  (18 12:19 AM)  (18 12:
19 AM)

 

 Bili Direct [0.0-0.3  <0.1 mg/dL  <0.1 mg/dL  0.1 mg/dL  



 mg/dL]  (18 1:26 AM)  (18 12:19 PM)  (18 12:19 AM)  

 

 Bili Indirect  Unable to Calculate  Unable to Calculate    



 [0.0-1.0]  *NA*  *NA*    



   (18 1:26 AM)  (18 12:19 PM)    

 

 Bili Indirect  0.8 mg/dL      



 [0.0-1.0 mg/dL]  (18 12:19 AM)      

 

 Lactic Acid Lvl  1.7 mMol/L      



 [0.5-2.2 mMol/L]  (18 6:35 PM)      

 

 Osmolality [280-300  290 mOsm/kg  296 mOsm/kg  295 mOsm/kg  



 mOsm/kg]  (18 4:55 AM)  (7/3/18 5:31 PM)  (7/3/18 2:41 AM)  



1Result Comment: The eGFR is calculated using the CKD-EPI formula. In most young
, healthy individualsthe eGFR will be &gt;90 mL/min/1.73m2. The eGFR declines 
with age. An eGFR of 60-89 may be normal insome populations, particularly the 
elderly, for whom the CKD-EPI formula has not been extensively validated. Use 
of the eGFR is not recommended in the following populations:



Individuals with unstable creatinine concentrations, including pregnant 
patients and those with serious co-morbid conditions.



Patients with extremes in muscle mass or diet.



The data above are obtained from the National Kidney Disease Education Program (
NKDEP) which additionally recommends that when the eGFR is used in patients 
with extremes of body mass index for purposesof drug dosing, the eGFR should be 
multiplied by the estimated BMI.2Result Comment: The eGFR is calculated using 
the CKD-EPI formula. In most young, healthy individualsthe eGFR will be &gt;90 
mL/min/1.73m2. The eGFR declines with age. An eGFR of 60-89 may be normal 
insome populations, particularly the elderly, for whom the CKD-EPI formula has 
not been extensively validated. Use of the eGFR is not recommended in the 
following populations:



Individuals with unstable creatinine concentrations, including pregnant 
patients and those with serious co-morbid conditions.



Patients with extremes in muscle mass or diet.



The data above are obtained from the National Kidney Disease Education Program (
NKDEP) which additionally recommends that when the eGFR is used in patients 
with extremes of body mass index for purposesof drug dosing, the eGFR should be 
multiplied by the estimated BMI.3Result Comment: The eGFR is calculated using 
the CKD-EPI formula. In most young, healthy individualsthe eGFR will be &gt;90 
mL/min/1.73m2. The eGFR declines with age. An eGFR of 60-89 may be normal 
insome populations, particularly the elderly, for whom the CKD-EPI formula has 
not been extensively validated. Use of the eGFR is not recommended in the 
following populations:



Individuals with unstable creatinine concentrations, including pregnant 
patients and those with serious co-morbid conditions.



Patients with extremes in muscle mass or diet.



The data above are obtained from the National Kidney Disease Education Program (
NKDEP) which additionally recommends that when the eGFR is used in patients 
with extremes of body mass index for purposesof drug dosing, the eGFR should be 
multiplied by the estimated BMI.CARDIAC ENZYMES





 Most recent to oldest  1  2  3  4



 [Reference Range]:        

 

 Total CK [ unit/L]  338 unit/L  117 unit/L    



   *HI*  (18 7:55 PM)    



   (18 8:05 AM)      

 

 CK MB [0.5-3.6 ng/mL]  5.3 ng/mL      



   *HI*      



   (18 8:05 AM)      

 

 CK MB Index [0.0-2.5]  1.6      



   (18 8:05 AM)      

 

 Troponin-T [0.000-0.100  <0.010 ng/mL  <0.010 ng/mL    



 ng/mL]  (18 8:05 AM)  (18 7:55 PM)    

 

 Troponin-I [0.00-0.40  <0.02 ng/mL  <0.02 ng/mL  <0.02 ng/mL  



 ng/mL]  (18 7:42 AM)  (18 8:05 AM)  (18 7:55 PM)  



PARATHYROID PROFILE





 Most recent to oldest  1  2  3  4



 [Reference Range]:        

 

 Ca Ion WB [1.05-1.25 mMol/L]  1.09 mMol/L  1.04 mMol/L  1.08 mMol/L  



   (18 1:26 AM)  *LOW*  (18 2:58 AM)  



     (7/3/18 2:41 AM)    

 

 Ca Norm WB [1.05-1.25 mMol/L]  1.10 mMol/L  1.09 mMol/L  1.13 mMol/L  



   (18 1:26 AM)  (7/3/18 2:41 AM)  (18 2:58 AM)  



DRUG SCREEN





 Most recent to oldest [Reference Range]:  1  2  3  4

 

 U Amph Scr [Negative]  Negative      



   *NA*      



   (18 12:19 AM)      

 

 U Prudence Scr [Negative]  Negative      



   *NA*      



   (18 12:19 AM)      

 

 U Benzodia Scr [Negative]  Positive      



   *ABN*      



   (18 12:19 AM)      

 

 U Cocaine Scr [Negative]  Negative      



   *NA*      



   (18 12:19 AM)      

 

 U Opiate Scr [Negative]  Negative      



   *NA*      



   (18 12:19 AM)      

 

 U Phencyc Scr [Negative]  Negative      



   *NA*      



   (18 12:19 AM)      

 

 U Cannab Scr [Negative]  Negative      



   *NA*      



   (18 12:19 AM)      

 

 UDS Note  See Note      



   (18 12:19 AM)      



TOXICOLOGY





 Most recent to oldest [Reference Range]:  1  2  3  4

 

 Etoh (%)  <.003 %      



   *NA*      



   (18 6:35 PM)      

 

 Ethanol Lvl  <3 mg/dL      



   *NA*      



   (18 6:35 PM)      



ENDOCRINOLOGY





 Most recent to oldest [Reference Range]:  1  2  3  4

 

 S Preg [Negative]  Negative      



   *NA*      



   (18 6:35 PM)      



URINE AND STOOL





 Most recent to oldest [Reference Range]:  1  2  3  4

 

 UA Turbidity [Clear]  Slight  Clear    



   *ABN*  (18 12:19 AM)    



   (18 3:47 PM)      

 

 UA Color [Yellow]  Yellow  Yellow    



   *NA*  *NA*    



   (18 3:47 PM)  (18 12:19 AM)    

 

 UA pH [5.0-8.0]  6.0  6.0    



   (18 3:47 PM)  (18 12:19 AM)    

 

 UA Spec Grav [<=1.030]  1.028  1.023    



   (18 3:47 PM)  (18 12:19 AM)    

 

 UA Glucose [Negative mg/dL]  Negative mg/dL  Negative mg/dL    



   *NA*  *NA*    



   (18 3:47 PM)  (18 12:19 AM)    

 

 UA Blood [Negative]  Moderate  Small    



   *ABN*  *ABN*    



   (18 3:47 PM)  (18 12:19 AM)    

 

 UA Ketones [Negative mg/dL]  Negative mg/dL  80 mg/dL    



   *NA*  *ABN*    



   (18 3:47 PM)  (18 12:19 AM)    

 

 UA Protein [Negative mg/dL]  100 mg/dL  50 mg/dL    



   *ABN*  *ABN*    



   (18 3:47 PM)  (18 12:19 AM)    

 

 UA Urobilinogen [0.1-1.0 mg/dL]  2.0 mg/dL  2.0 mg/dL    



   *HI*  *HI*    



   (18 3:47 PM)  (18 12:19 AM)    

 

 UA Bili [Negative]  Negative  Negative    



   *NA*  *NA*    



   (18 3:47 PM)  (18 12:19 AM)    

 

 UA Leuk Est [Negative]  Negative  Negative    



   (18 3:47 PM)  (18 12:19 AM)    

 

 UA Nitrite [Negative]  Negative  Negative    



   (18 3:47 PM)  (18 12:19 AM)    

 

 UA WBC [0-5 /HPF]  6 /HPF  2 /HPF    



   *HI*  (18 12:19 AM)    



   (18 3:47 PM)      

 

 UA RBC [0-2 /HPF]  >182 /HPF  14 /HPF    



   *HI*  *HI*    



   (18 3:47 PM)  (18 12:19 AM)    

 

 UA Bacteria [None Seen /HPF]  Moderate /HPF      



   *ABN*      



   (18 3:47 PM)      

 

 UA Sq Epi  None Seen  None Seen    



   *NA*  *NA*    



   (18 3:47 PM)  (18 12:19 AM)    

 

 UA Mucus [None Seen /LPF]  Many /LPF  Many /LPF    



   *ABN*  *ABN*    



   (18 3:47 PM)  (18 12:19 AM)    

 

 UA Gran Cast  5 /LPF      



   *NA*      



   (18 3:47 PM)      



HEMATOLOGY





 Most recent to oldest  1  2  3  4



 [Reference Range]:        

 

 WBC [3.7-10.4 K/CMM]  11.7 K/CMM  10.3 K/CMM  10.5 K/CMM  



   *HI*  (7/3/18 2:41 AM)  *HI*  



   (18 1:26 AM)    (18 2:58 AM)  

 

 RBC [4.20-5.40 M/CMM]  3.49 M/CMM  3.25 M/CMM  3.11 M/CMM  



   *LOW*  *LOW*  *LOW*  



   (18 1:26 AM)  (7/3/18 2:41 AM)  (18 2:58 AM)  

 

 Hgb [12.0-16.0 g/dL]  10.6 g/dL  10.1 g/dL  9.6 g/dL  



   *LOW*  *LOW*  *LOW*  



   (18 1:26 AM)  (7/3/18 2:41 AM)  (18 2:58 AM)  

 

 Hct [36.0-48.0 %]  31.2 %  29.4 %  27.8 %  



   *LOW*  *LOW*  *LOW*  



   (18 1:26 AM)  (7/3/18 2:41 AM)  (18 2:58 AM)  

 

 MCV [80.0-98.0 fL]  89.4 fL  90.3 fL  89.5 fL  



   (18 1:26 AM)  (7/3/18 2:41 AM)  (18 2:58 AM)  

 

 MCH [27.0-31.0 pg]  30.3 pg  31.2 pg  30.9 pg  



   (18 1:26 AM)  *HI*  (18 2:58 AM)  



     (7/3/18 2:41 AM)    

 

 MCHC [32.0-36.0 g/dL]  33.9 g/dL  34.5 g/dL  34.5 g/dL  



   (18 1:26 AM)  (7/3/18 2:41 AM)  (18 2:58 AM)  

 

 RDW [11.5-14.5 %]  13.3 %  14.0 %  13.9 %  



   (18 1:26 AM)  (7/3/18 2:41 AM)  (18 2:58 AM)  

 

 MPV [7.4-10.4 fL]  9.6 fL  9.8 fL  10.2 fL  



   (18 1:26 AM)  (7/3/18 2:41 AM)  (18 2:58 AM)  

 

 Platelet [133-450 K/CMM]  245 K/CMM  211 K/CMM  201 K/CMM  



   (18 1:26 AM)  (7/3/18 2:41 AM)  (18 2:58 AM)  

 

 Segs [45.0-75.0 %]  81.9 %  77.3 %  81.0 %  



   *HI*  *HI*  *HI*  



   (18 1:26 AM)  (7/3/18 2:41 AM)  (18 2:58 AM)  

 

 Lymphocytes [20.0-40.0 %]  8.0 %  11.5 %  9.6 %  



   *LOW*  *LOW*  *LOW*  



   (18 1:26 AM)  (7/3/18 2:41 AM)  (18 2:58 AM)  

 

 Monocytes [2.0-12.0 %]  7.7 %  8.9 %  8.6 %  



   (18 1:26 AM)  (7/3/18 2:41 AM)  (18 2:58 AM)  

 

 Eosinophils [0.0-4.0 %]  2.1 %  1.9 %  0.5 %  



   (18 1:26 AM)  (7/3/18 2:41 AM)  (18 2:58 AM)  

 

 Basophils [0.0-1.0 %]  0.3 %  0.4 %  0.3 %  



   (18 1:26 AM)  (7/3/18 2:41 AM)  (18 2:58 AM)  

 

 Segs-Bands # [1.5-8.1  9.5 K/CMM  8.0 K/CMM  8.5 K/CMM  



 K/CMM]  *HI*  (7/3/18 2:41 AM)  *HI*  



   (18 1:26 AM)    (18 2:58 AM)  

 

 Lymphocytes # [1.0-5.5  0.9 K/CMM  1.2 K/CMM  1.0 K/CMM  



 K/CMM]  *LOW*  (7/3/18 2:41 AM)  (18 2:58 AM)  



   (18 1:26 AM)      

 

 Monocytes # [0.0-0.8  0.9 K/CMM  0.9 K/CMM  0.9 K/CMM  



 K/CMM]  *HI*  *HI*  *HI*  



   (18 1:26 AM)  (7/3/18 2:41 AM)  (18 2:58 AM)  

 

 Eosinophils # [0.0-0.5  0.2 K/CMM  0.2 K/CMM  0.1 K/CMM  



 K/CMM]  (18 1:26 AM)  (7/3/18 2:41 AM)  (18 12:06 AM)  

 

 PT [12.0-14.7 seconds]  15.0 seconds      



   *HI*      



   (18 7:44 PM)      

 

 INR [0.85-1.17]  1.17      



   (18 7:44 PM)      

 

 PTT [22.9-35.8 seconds]  28.9 seconds      



   (18 7:44 PM)      

 

 ACT (TEG) Rapid [  113 seconds  121 seconds    



 seconds]  (18 12:25 AM)  *HI*    



     (18 6:35 PM)    

 

 Split Point Rapid  0.6 minutes  0.7 minutes    



   *NA*  *NA*    



   (18 12:25 AM)  (18 6:35 PM)    

 

 R-time Rapid [0.4-0.7  0.7 minutes  0.8 minutes    



 minutes]  (18 12:25 AM)  *HI*    



     (18 6:35 PM)    

 

 K-time Rapid [0.6-2.3  1.3 minutes  1.5 minutes    



 minutes]  (18 12:25 AM)  (18 6:35 PM)    

 

 Angle Rapid [64-80  74 degrees  72 degrees    



 degrees]  (18 12:25 AM)  (18 6:35 PM)    

 

 Max Amplitude Rapid [52-71  63 mm  67 mm    



 mm]  (18 12:25 AM)  (18 6:35 PM)    

 

 G-value Rapid [5.0-11.6 K  8.6 K d/sc  10.1 K d/sc    



 d/sc]  (18 12:25 AM)  (18 6:35 PM)    

 

 Estimated % Lysis Rapid  0.1 %  1.7 %    



 [0.0-7.5 %]  (18 12:25 AM)  (18 6:35 PM)    

 

 ASA Effect Plt  432 ARU  423 ARU    



   *NA*  *NA*    



   (18 12:25 AM)  (18 7:44 PM)    

 

 Plav Effect Plt  295 PRU      



   *NA*      



   (18 7:44 PM)      



BACTERIAL - SEROLOGY





 Most recent to oldest [Reference Range]:  1  2  3  4

 

 MRSA by PCR  Negative      



   (18 11:50 PM)      



Microbiology Reports



TEST:Culture: Urine

STATUS:Auth (Verified)

BODY SITE:

SOURCE:Urine, Clean Catch

COLLECTED DATE/TIME:18 4:59 PM***FINAL REPORT***&gt;100,000 CFU/mL Group B 
Streptococcus

No susceptibility performed since these organisms are

predictably susceptible to penicillin.  If patient is

penicillin allergic or susceptibility testing for additional

antibiotics is clinically warranted please call the

laboratory.



Immunizations

No data available for this section



Procedures







 Procedure  Date  Related Diagnosis  Body Site  Status

 

 Mastectomy        Completed







Social History







 Social History Type  Response

 

 Smoking Status  Never smoker; Previous treatment: None; Ready to change: No; 
Concerns about tobacco use in household: No; Exposure to Tobacco Smoke None; 
Cigarette Smoking Last 365 Days No; Reg Smoking Cessation Counseling No



   entered on: 18







Assessment and Plan

Extracted from:





 Title: ENT Consultation  Author: Malinda Manzo MD  Date: 18









  Impression and Plan



 Patient is a 62 yF who sustained a L TM perforation and temporal bone fracture 
after an unwitnessed fall.



 - no acute surgical intervention



 - Ciprodex 5 drops twice daily for 1 week to the L ear



 - pinpoint perforation with hemotympanum. Recommend dry ear precautions, use 
cotton to keep ear dry when showering



 - facial strength symmetric and intact



 - will need to follow up with [facility] for formal audiogram and to ensure 
healing of TM perforation, please call [phone] to schedule appointment



 - will disucss with staff



 



 Malinda Manzo



 Otolaryngology PGY-2



 MSO# [id number]



 



 Please page with any questions: [phone]



 



 Teaching Physician Attestation



 Pt with Lt TB fracture with TM perforation that should heal spontaneously, but 
agree with ear drops. Will need to assess Hearing as outpt after hemotympanum 
resolves. No acute surgical intervention



 _



 I personally examined this patient with the resident/fellow and agree the 
document history, examination and medical decision-making.



 _



 I agree with all components of the examination, except as noted.



 X



 I personally examined this patient and agree with the documented history, exam
, and medical decision-making.



 _



 I have personally reviewed the diagnostic procedure, the resident/fellow



s interpretation thereof, and I ____ agree with the documented findings or____ 
agree with the documented findings, except as noted.



 _



 I was physically present for ____ nasal endoscopy, _____ nasal endoscopy with 
debridement, and/or ____ Laryngoscopy (scope insertion/removal).



 _



 I was physically present for the entire minor procedure ____.



 _



 I have personally participated in the procedure _____ and agree with the 
documented procedure note, except as noted.



 _



 I personally performed critical portions of the examination.



 _



 I have personally reviewed the following: ____ laboratory results, ____ past 
medical records, and/or _X__ diagnostic imaging.



 _



 See dictated note _______________. ____ see addendum. above noted discussed case with surgical resident abdomen soft tender LLQ on IV AB above noted discussed case with surgical resident on IV AB Patient seen and examined independently. Agree with resident note/ history / physical exam and plan of care with following exceptions/additions/updates. Case discussed with patient/pt decision maker, house-staff and nursing.     surgery consult called. may need sigmoid resection.